# Patient Record
Sex: MALE | Race: WHITE | Employment: FULL TIME | URBAN - METROPOLITAN AREA
[De-identification: names, ages, dates, MRNs, and addresses within clinical notes are randomized per-mention and may not be internally consistent; named-entity substitution may affect disease eponyms.]

---

## 2017-02-23 ENCOUNTER — OFFICE VISIT (OUTPATIENT)
Dept: FAMILY MEDICINE CLINIC | Age: 67
End: 2017-02-23

## 2017-02-23 VITALS
WEIGHT: 221.4 LBS | TEMPERATURE: 97.7 F | OXYGEN SATURATION: 92 % | HEIGHT: 69 IN | HEART RATE: 71 BPM | DIASTOLIC BLOOD PRESSURE: 62 MMHG | SYSTOLIC BLOOD PRESSURE: 107 MMHG | RESPIRATION RATE: 20 BRPM | BODY MASS INDEX: 32.79 KG/M2

## 2017-02-23 DIAGNOSIS — Z12.12 ENCOUNTER FOR COLORECTAL CANCER SCREENING: ICD-10-CM

## 2017-02-23 DIAGNOSIS — Z12.11 ENCOUNTER FOR COLORECTAL CANCER SCREENING: ICD-10-CM

## 2017-02-23 DIAGNOSIS — E55.9 HYPOVITAMINOSIS D: ICD-10-CM

## 2017-02-23 DIAGNOSIS — E11.9 ENCOUNTER FOR DIABETIC FOOT EXAM (HCC): ICD-10-CM

## 2017-02-23 DIAGNOSIS — Z13.29 SCREENING FOR THYROID DISORDER: ICD-10-CM

## 2017-02-23 DIAGNOSIS — E11.9 DIABETIC EYE EXAM (HCC): ICD-10-CM

## 2017-02-23 DIAGNOSIS — Z00.00 ANNUAL PHYSICAL EXAM: ICD-10-CM

## 2017-02-23 DIAGNOSIS — Z12.5 SCREENING FOR PROSTATE CANCER: ICD-10-CM

## 2017-02-23 DIAGNOSIS — E55.9 VITAMIN D DEFICIENCY: ICD-10-CM

## 2017-02-23 DIAGNOSIS — Z01.00 DIABETIC EYE EXAM (HCC): ICD-10-CM

## 2017-02-23 DIAGNOSIS — I10 HYPERTENSION, WELL CONTROLLED: ICD-10-CM

## 2017-02-23 DIAGNOSIS — Z12.11 SCREEN FOR COLON CANCER: ICD-10-CM

## 2017-02-23 DIAGNOSIS — E78.2 MIXED HYPERCHOLESTEROLEMIA AND HYPERTRIGLYCERIDEMIA: ICD-10-CM

## 2017-02-23 LAB
GLUCOSE POC: 133 MG/DL
HBA1C MFR BLD HPLC: 6.5 %

## 2017-02-23 RX ORDER — ALLOPURINOL 300 MG/1
300 TABLET ORAL DAILY
Qty: 90 TAB | Refills: 1 | Status: SHIPPED | OUTPATIENT
Start: 2017-02-23 | End: 2017-07-26 | Stop reason: SDUPTHER

## 2017-02-23 RX ORDER — ATORVASTATIN CALCIUM 20 MG/1
20 TABLET, FILM COATED ORAL DAILY
Qty: 30 TAB | Refills: 5 | Status: SHIPPED | OUTPATIENT
Start: 2017-02-23 | End: 2017-07-26 | Stop reason: SDUPTHER

## 2017-02-23 NOTE — PROGRESS NOTES
1. Have you been to the ER, urgent care clinic since your last visit? Hospitalized since your last visit? No    2. Have you seen or consulted any other health care providers outside of the [facility] since your last visit? Include any pap smears or colon screening.  No     Pt states he is here for diab and med refill

## 2017-02-23 NOTE — PROGRESS NOTES
Chief Complaint   Patient presents with    Medication Refill    Diabetes     HPI:  Cecily Ward is a 61 y.o. male with hypertension, diabetes presenting for routine follow up for medication refill. 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 No pertinent family history. Current Outpatient Prescriptions   Medication Sig Dispense Refill    metFORMIN (GLUCOPHAGE) 500 mg tablet Take 1 Tab by mouth two (2) times daily (with meals). Indications: TYPE 2 DIABETES MELLITUS 60 Tab 5    glucose blood VI test strips (ONETOUCH ULTRA TEST) strip Check blood sugar once a day 50 Strip 3    atorvastatin (LIPITOR) 20 mg tablet Take 1 Tab by mouth daily. Indications: HYPERTRIGLYCERIDEMIA 30 Tab 5    allopurinol (ZYLOPRIM) 300 mg tablet Take 1 Tab by mouth daily. Take every day to prevent gout attacks 90 Tab 1    indomethacin (INDOCIN) 50 mg capsule Take 1 every 8 hrs x 7 days, then 1 every 12 hrs 90 Cap 2    aspirin (ASPIRIN) 325 mg tablet Take 325 mg by mouth daily.          No Known Allergies    Objective:  Visit Vitals    /62 (BP 1 Location: Left arm, BP Patient Position: Sitting)    Pulse 71    Temp 97.7 °F (36.5 °C) (Oral)    Resp 20    Ht 5' 8.5\" (1.74 m)    Wt 221 lb 6.4 oz (100.4 kg)    SpO2 92%    BMI 33.17 kg/m2     Physical Exam: General appearance - alert, well appearing, in no distress  Mental status - alert, oriented to person, place, and time  EYE-PERRL, EOMI  Neck - supple, no significant adenopathy   Chest - clear to auscultation, no wheezes, rales or rhonchi  Heart - normal rate, regular rhythm, normal blood presure  Abdomen - soft, nontender, nondistended, no organomegaly  Ext-peripheral pulses normal, no pedal edema  Neuro -alert, oriented, normal speech, no focal findings    Results for orders placed or performed in visit on 02/23/17   AMB POC HEMOGLOBIN A1C   Result Value Ref Range    Hemoglobin A1c (POC) 6.5 %   AMB POC GLUCOSE BLOOD, BY GLUCOSE MONITORING DEVICE   Result Value Ref Range    Glucose  mg/dL     Assessment/Plan:    ICD-10-CM ICD-9-CM    1. Uncontrolled type 2 diabetes mellitus without complication, without long-term current use of insulin ([facility]) E11.65 250.02 AMB POC HEMOGLOBIN A1C      AMB POC GLUCOSE BLOOD, BY GLUCOSE MONITORING DEVICE      URINALYSIS W/ RFLX MICROSCOPIC      MICROALBUMIN, UR, RAND W/ MICROALBUMIN/CREA RATIO      METABOLIC PANEL, COMPREHENSIVE   2. Mixed hypercholesterolemia and hypertriglyceridemia E78.2 272.2 LIPID PANEL      atorvastatin (LIPITOR) 20 mg tablet   3. Obesity, Class II, BMI 35-39.9, with comorbidity ([facility]) E66.01 278.01    4. Hypertension, well controlled B12 868.9 METABOLIC PANEL, COMPREHENSIVE   5. Hypovitaminosis D E55.9 268.9    6. Diabetic eye exam ([facility] E11.9 V72.0 REFERRAL TO OPHTHALMOLOGY    Z01.00 250.00    7. Encounter for diabetic foot exam ([facility] E11.9 250.00 REFERRAL TO PODIATRY   8. Encounter for colorectal cancer screening Z12.11 V76.51 REFERRAL TO GASTROENTEROLOGY    Z12.12     9. Vitamin D deficiency E55.9 268.9 VITAMIN D, 25 HYDROXY   10.  Annual physical exam Z00.00 V70.0 URINALYSIS W/ RFLX MICROSCOPIC      CBC W/O DIFF      METABOLIC PANEL, COMPREHENSIVE   11. Screen for colon cancer Z12.11 V76.51 REFERRAL TO GASTROENTEROLOGY   12. Screening for thyroid disorder Z13.29 V77.0 TSH 3RD GENERATION   13. Screening for prostate cancer Z12.5 V76.44 PROSTATE SPECIFIC AG (PSA)     Patient Instructions        Colon Cancer Screening: Care Instructions  Your Care Instructions  Colorectal cancer occurs in the colon or rectum. That's the lower part of your digestive system. It is the second-leading cause of cancer deaths in the [place]. It often starts with small growths called polyps in the colon or rectum. Polyps are usually found with screening tests. Depending on the type of test, any polyps found may be removed during the tests. Colorectal cancer usually does not cause symptoms at first. But regular tests can help find it early, before it spreads and becomes harder to treat. Experts advise routine tests for colon cancer for people starting at age 48. And they advise people with a higher risk of colon cancer to get tested sooner. Talk with your doctor about when you should start testing. Discuss which tests you need. Follow-up care is a key part of your treatment and safety. Be sure to make and go to all appointments, and call your doctor if you are having problems. It's also a good idea to know your test results and keep a list of the medicines you take. What are the main screening tests for colon cancer? · Stool tests. These include the fecal immunochemical test (FIT) and the fecal occult blood test (FOBT). These tests check stool samples for signs of cancer. If your test is positive, you will need to have a colonoscopy. · Sigmoidoscopy. This test lets your doctor look at the lining of your rectum and the lowest part of your colon. Your doctor uses a lighted tube called a sigmoidoscope. This test can't find cancers or polyps in the upper part of your colon. In some cases, polyps that are found can be removed. But if your doctor finds polyps, you will need to have a colonoscopy to check the upper part of your colon. · Colonoscopy.  This test lets your doctor look at the lining of your rectum and your entire colon. The doctor uses a thin, flexible tool called a colonoscope. It can also be used to remove polyps or get a tissue sample (biopsy). What tests do you need? The following guidelines are for people age 48 and over who are not at high risk for colorectal cancer. You may have at least one of these tests as directed by your doctor. · Fecal immunochemical test (FIT) or fecal occult blood test (FOBT) every year  · Sigmoidoscopy every 5 years  · Colonoscopy every 10 years  If you are age 76 and have had regular screenings or are age [de-identified] or older, you may not need screening. Talk with your doctor about when you need to be tested. And discuss which tests are right for you. Your doctor may recommend earlier or more frequent testing if you:  · Have had colorectal cancer before. · Have had colon polyps. · Have symptoms of colorectal cancer. These include blood in your stool and changes in your bowel habits. · Have a parent, brother or sister, or child with colon polyps or colorectal cancer. · Have a bowel disease. This includes ulcerative colitis and Crohn's disease. · Have a rare polyp syndrome that runs in families, such as familial adenomatous polyposis (FAP). · Have had radiation treatments to the belly or pelvis. When should you call for help? Call your doctor now or seek immediate medical care if:  · Your stools are black and tarlike or have streaks of blood. · You have pain or tenderness in your lower belly. Watch closely for changes in your health, and be sure to contact your doctor if:  · You have diarrhea, constipation, or another change in bowel habits that does not get better. · Your stools are narrower than usual.  · You lose weight and you don't know why. · You have any questions about your screening tests or schedule. Where can you learn more? Go to http://lyla-mavis.info/.   Enter M541 in the search box to learn more about \"Colon Cancer Screening: Care Instructions. \"  Current as of: July 26, 2016  Content Version: 11.1  © 5417-9397 [employer], [employer]. Care instructions adapted under license by [employer] (which disclaims liability or warranty for this information). If you have questions about a medical condition or this instruction, always ask your healthcare professional. [place] [address] any warranty or liability for your use of this information. Follow-up Disposition:  Return 2-3 weeks, for f/u results.

## 2017-02-24 LAB
25(OH)D3+25(OH)D2 SERPL-MCNC: 12.7 NG/ML (ref 30–100)
ALBUMIN SERPL-MCNC: 4.3 G/DL (ref 3.6–4.8)
ALBUMIN/CREAT UR: 4 MG/G CREAT (ref 0–30)
ALBUMIN/GLOB SERPL: 1.5 {RATIO} (ref 1.1–2.5)
ALP SERPL-CCNC: 127 IU/L (ref 39–117)
ALT SERPL-CCNC: 24 IU/L (ref 0–44)
APPEARANCE UR: CLEAR
AST SERPL-CCNC: 19 IU/L (ref 0–40)
BILIRUB SERPL-MCNC: 0.5 MG/DL (ref 0–1.2)
BILIRUB UR QL STRIP: NEGATIVE
BUN SERPL-MCNC: 11 MG/DL (ref 8–27)
BUN/CREAT SERPL: 10 (ref 10–22)
CALCIUM SERPL-MCNC: 9.4 MG/DL (ref 8.6–10.2)
CHLORIDE SERPL-SCNC: 101 MMOL/L (ref 96–106)
CHOLEST SERPL-MCNC: 192 MG/DL (ref 100–199)
CO2 SERPL-SCNC: 25 MMOL/L (ref 18–29)
COLOR UR: YELLOW
CREAT SERPL-MCNC: 1.12 MG/DL (ref 0.76–1.27)
CREAT UR-MCNC: 174.7 MG/DL
ERYTHROCYTE [DISTWIDTH] IN BLOOD BY AUTOMATED COUNT: 13.8 % (ref 12.3–15.4)
GLOBULIN SER CALC-MCNC: 2.9 G/DL (ref 1.5–4.5)
GLUCOSE SERPL-MCNC: 107 MG/DL (ref 65–99)
GLUCOSE UR QL: NEGATIVE
HCT VFR BLD AUTO: 46.4 % (ref 37.5–51)
HDLC SERPL-MCNC: 32 MG/DL
HGB BLD-MCNC: 16.1 G/DL (ref 12.6–17.7)
HGB UR QL STRIP: NEGATIVE
KETONES UR QL STRIP: NEGATIVE
LDLC SERPL CALC-MCNC: 109 MG/DL (ref 0–99)
LEUKOCYTE ESTERASE UR QL STRIP: NEGATIVE
MCH RBC QN AUTO: 29.8 PG (ref 26.6–33)
MCHC RBC AUTO-ENTMCNC: 34.7 G/DL (ref 31.5–35.7)
MCV RBC AUTO: 86 FL (ref 79–97)
MICRO URNS: NORMAL
MICROALBUMIN UR-MCNC: 7 UG/ML
NITRITE UR QL STRIP: NEGATIVE
PH UR STRIP: 5.5 [PH] (ref 5–7.5)
PLATELET # BLD AUTO: 227 X10E3/UL (ref 150–379)
POTASSIUM SERPL-SCNC: 4.5 MMOL/L (ref 3.5–5.2)
PROT SERPL-MCNC: 7.2 G/DL (ref 6–8.5)
PROT UR QL STRIP: NEGATIVE
PSA SERPL-MCNC: 4.5 NG/ML (ref 0–4)
RBC # BLD AUTO: 5.4 X10E6/UL (ref 4.14–5.8)
SODIUM SERPL-SCNC: 142 MMOL/L (ref 134–144)
SP GR UR: 1.03 (ref 1–1.03)
TRIGL SERPL-MCNC: 253 MG/DL (ref 0–149)
TSH SERPL DL<=0.005 MIU/L-ACNC: 2.74 UIU/ML (ref 0.45–4.5)
UROBILINOGEN UR STRIP-MCNC: 0.2 MG/DL (ref 0.2–1)
VLDLC SERPL CALC-MCNC: 51 MG/DL (ref 5–40)
WBC # BLD AUTO: 9 X10E3/UL (ref 3.4–10.8)

## 2017-02-25 NOTE — PATIENT INSTRUCTIONS
Colon Cancer Screening: Care Instructions  Your Care Instructions  Colorectal cancer occurs in the colon or rectum. That's the lower part of your digestive system. It is the second-leading cause of cancer deaths in the [place]. It often starts with small growths called polyps in the colon or rectum. Polyps are usually found with screening tests. Depending on the type of test, any polyps found may be removed during the tests. Colorectal cancer usually does not cause symptoms at first. But regular tests can help find it early, before it spreads and becomes harder to treat. Experts advise routine tests for colon cancer for people starting at age 48. And they advise people with a higher risk of colon cancer to get tested sooner. Talk with your doctor about when you should start testing. Discuss which tests you need. Follow-up care is a key part of your treatment and safety. Be sure to make and go to all appointments, and call your doctor if you are having problems. It's also a good idea to know your test results and keep a list of the medicines you take. What are the main screening tests for colon cancer? · Stool tests. These include the fecal immunochemical test (FIT) and the fecal occult blood test (FOBT). These tests check stool samples for signs of cancer. If your test is positive, you will need to have a colonoscopy. · Sigmoidoscopy. This test lets your doctor look at the lining of your rectum and the lowest part of your colon. Your doctor uses a lighted tube called a sigmoidoscope. This test can't find cancers or polyps in the upper part of your colon. In some cases, polyps that are found can be removed. But if your doctor finds polyps, you will need to have a colonoscopy to check the upper part of your colon. · Colonoscopy. This test lets your doctor look at the lining of your rectum and your entire colon. The doctor uses a thin, flexible tool called a colonoscope.  It can also be used to remove polyps or get a tissue sample (biopsy). What tests do you need? The following guidelines are for people age 48 and over who are not at high risk for colorectal cancer. You may have at least one of these tests as directed by your doctor. · Fecal immunochemical test (FIT) or fecal occult blood test (FOBT) every year  · Sigmoidoscopy every 5 years  · Colonoscopy every 10 years  If you are age 76 and have had regular screenings or are age [de-identified] or older, you may not need screening. Talk with your doctor about when you need to be tested. And discuss which tests are right for you. Your doctor may recommend earlier or more frequent testing if you:  · Have had colorectal cancer before. · Have had colon polyps. · Have symptoms of colorectal cancer. These include blood in your stool and changes in your bowel habits. · Have a parent, brother or sister, or child with colon polyps or colorectal cancer. · Have a bowel disease. This includes ulcerative colitis and Crohn's disease. · Have a rare polyp syndrome that runs in families, such as familial adenomatous polyposis (FAP). · Have had radiation treatments to the belly or pelvis. When should you call for help? Call your doctor now or seek immediate medical care if:  · Your stools are black and tarlike or have streaks of blood. · You have pain or tenderness in your lower belly. Watch closely for changes in your health, and be sure to contact your doctor if:  · You have diarrhea, constipation, or another change in bowel habits that does not get better. · Your stools are narrower than usual.  · You lose weight and you don't know why. · You have any questions about your screening tests or schedule. Where can you learn more? Go to http://lyla-mavis.info/. Enter M541 in the search box to learn more about \"Colon Cancer Screening: Care Instructions. \"  Current as of: July 26, 2016  Content Version: 11.1  © 1952-8687 [employer], [employer].  Care instructions adapted under license by [employer] (which disclaims liability or warranty for this information). If you have questions about a medical condition or this instruction, always ask your healthcare professional. [address] any warranty or liability for your use of this information.

## 2017-03-28 ENCOUNTER — OFFICE VISIT (OUTPATIENT)
Dept: FAMILY MEDICINE CLINIC | Age: 67
End: 2017-03-28

## 2017-03-28 VITALS
RESPIRATION RATE: 16 BRPM | SYSTOLIC BLOOD PRESSURE: 116 MMHG | HEART RATE: 86 BPM | WEIGHT: 219.2 LBS | DIASTOLIC BLOOD PRESSURE: 88 MMHG | HEIGHT: 69 IN | BODY MASS INDEX: 32.47 KG/M2 | TEMPERATURE: 97 F | OXYGEN SATURATION: 97 %

## 2017-03-28 DIAGNOSIS — I10 HYPERTENSION, WELL CONTROLLED: ICD-10-CM

## 2017-03-28 DIAGNOSIS — E55.9 HYPOVITAMINOSIS D: ICD-10-CM

## 2017-03-28 DIAGNOSIS — E78.2 MIXED HYPERCHOLESTEROLEMIA AND HYPERTRIGLYCERIDEMIA: Primary | ICD-10-CM

## 2017-03-28 DIAGNOSIS — E11.00 UNCONTROLLED TYPE 2 DIABETES MELLITUS WITH HYPEROSMOLARITY WITHOUT COMA, WITHOUT LONG-TERM CURRENT USE OF INSULIN (HCC): ICD-10-CM

## 2017-03-28 DIAGNOSIS — R97.20 ELEVATED PSA MEASUREMENT: ICD-10-CM

## 2017-03-28 RX ORDER — ASPIRIN 325 MG
325 TABLET ORAL DAILY
Qty: 90 TAB | Refills: 1 | Status: SHIPPED | OUTPATIENT
Start: 2017-03-28

## 2017-03-28 RX ORDER — METFORMIN HYDROCHLORIDE 500 MG/1
500 TABLET ORAL 2 TIMES DAILY WITH MEALS
Qty: 60 TAB | Refills: 5 | Status: SHIPPED | OUTPATIENT
Start: 2017-03-28 | End: 2017-07-26 | Stop reason: SDUPTHER

## 2017-03-28 RX ORDER — CHOLECALCIFEROL TAB 125 MCG (5000 UNIT) 125 MCG
5000 TAB ORAL DAILY
Qty: 90 TAB | Refills: 3 | Status: SHIPPED | OUTPATIENT
Start: 2017-03-28 | End: 2018-08-14 | Stop reason: SDUPTHER

## 2017-03-28 NOTE — LETTER
3/28/2017 4:37 PM 
 
Mr. Homer [address] [facility] [address] Dear Homer: Please find your most recent results below. Resulted Orders AMB POC HEMOGLOBIN A1C Result Value Ref Range Hemoglobin A1c (POC) 6.5 % AMB POC GLUCOSE BLOOD, BY GLUCOSE MONITORING DEVICE Result Value Ref Range Glucose  mg/dL URINALYSIS W/ RFLX MICROSCOPIC Result Value Ref Range Specific Gravity 1.026 1.005 - 1.030  
 pH (UA) 5.5 5.0 - 7.5 Color Yellow Yellow Appearance Clear Clear Leukocyte Esterase Negative Negative Protein Negative Negative/Trace Glucose Negative Negative Ketone Negative Negative Blood Negative Negative Bilirubin Negative Negative Urobilinogen 0.2 0.2 - 1.0 mg/dL Nitrites Negative Negative Microscopic Examination Comment Comment:  
   Microscopic not indicated and not performed. Narrative Performed at:  [address]  [id number] : Anna Melendez MD, Phone:  [phone] MICROALBUMIN, UR, RAND W/ MICROALBUMIN/CREA RATIO Result Value Ref Range Creatinine, urine 174.7 Not Estab. mg/dL Microalbumin, urine 7.0 Not Estab. ug/mL Microalb/Creat ratio (ug/mg creat.) 4.0 0.0 - 30.0 mg/g creat Narrative Performed at:  [address]  [id number] : Anna Melendez MD, Phone:  [phone] CBC W/O DIFF Result Value Ref Range WBC 9.0 3.4 - 10.8 x10E3/uL  
 RBC 5.40 4. 14 - 5.80 x10E6/uL HGB 16.1 12.6 - 17.7 g/dL HCT 46.4 37.5 - 51.0 % MCV 86 79 - 97 fL  
 MCH 29.8 26.6 - 33.0 pg  
 MCHC 34.7 31.5 - 35.7 g/dL  
 RDW 13.8 12.3 - 15.4 % PLATELET 250 112 - 700 x10E3/uL Narrative Performed at:  [address]  [id number] : Anna Melendez MD, Phone:  [phone] METABOLIC PANEL, COMPREHENSIVE Result Value Ref Range Glucose 107 (H) 65 - 99 mg/dL BUN 11 8 - 27 mg/dL Creatinine 1.12 0.76 - 1.27 mg/dL GFR est non-AA 69 >59 mL/min/1.73 GFR est AA 80 >59 mL/min/1.73  
 BUN/Creatinine ratio 10 10 - 22 Sodium 142 134 - 144 mmol/L Potassium 4.5 3.5 - 5.2 mmol/L Chloride 101 96 - 106 mmol/L  
 CO2 25 18 - 29 mmol/L Calcium 9.4 8.6 - 10.2 mg/dL Protein, total 7.2 6.0 - 8.5 g/dL Albumin 4.3 3.6 - 4.8 g/dL GLOBULIN, TOTAL 2.9 1.5 - 4.5 g/dL A-G Ratio 1.5 1.1 - 2.5 Comment: **Effective March 13, 2017 the reference interval** 
  for A/G Ratio will be changing to: Age                Male          Female 0 -  7 days       1.1 - 2.3       1.1 - 2.3 
          8 - 30 days       1.2 - 2.8       1.2 - 2.8 
          1 -  6 months     1.3 - 3.6       1.3 - 3.6 
   7 months -  5 years      1.5 - 2.6       1.5 - 2.6 
             > 5 years      1.2 - 2.2       1.2 - 2.2 Bilirubin, total 0.5 0.0 - 1.2 mg/dL Alk. phosphatase 127 (H) 39 - 117 IU/L  
 AST (SGOT) 19 0 - 40 IU/L  
 ALT (SGPT) 24 0 - 44 IU/L Narrative Performed at:  [address]  [id number] : Ang Douglass MD, Phone:  [phone] TSH 3RD GENERATION Result Value Ref Range TSH 2.740 0.450 - 4.500 uIU/mL Narrative Performed at:  [address]  [id number] : Ang Douglass MD, Phone:  [phone] LIPID PANEL Result Value Ref Range Cholesterol, total 192 100 - 199 mg/dL Triglyceride 253 (H) 0 - 149 mg/dL HDL Cholesterol 32 (L) >39 mg/dL VLDL, calculated 51 (H) 5 - 40 mg/dL LDL, calculated 109 (H) 0 - 99 mg/dL Narrative Performed at:  [address]  [id number] : Ang Douglass MD, Phone:  [phone] VITAMIN D, 25 HYDROXY Result Value Ref Range VITAMIN D, 25-HYDROXY 12.7 (L) 30.0 - 100.0 ng/mL Comment:  
   Vitamin D deficiency has been defined by the [facility] practice guideline as a 
level of serum 25-OH vitamin D less than 20 ng/mL (1,2). The Endocrine Society went on to further define vitamin D 
insufficiency as a level between 21 and 29 ng/mL (2). 1. IOM ([place]). 2010. Dietary reference 
   intakes for calcium and D. [facility]: The 
   [employer]. 2. Cayden MF, Sin NC, Jaime SANTOYO, et al. 
   Evaluation, treatment, and prevention of vitamin D 
   deficiency: an Endocrine Society clinical practice 
   guideline. JCEM. 2011 Jul; 96(7):1911-30. Narrative Performed at:  [address]  [id number] : Kash Ren MD, Phone:  [phone] PROSTATE SPECIFIC AG (PSA) Result Value Ref Range Prostate Specific Ag 4.5 (H) 0.0 - 4.0 ng/mL Comment:  
   Roche ECLIA methodology. According to the American Urological Association, Serum PSA should 
decrease and remain at undetectable levels after radical 
prostatectomy. The AUA defines biochemical recurrence as an initial 
PSA value 0.2 ng/mL or greater followed by a subsequent confirmatory PSA value 0.2 ng/mL or greater. Values obtained with different assay methods or kits cannot be used 
interchangeably. Results cannot be interpreted as absolute evidence 
of the presence or absence of malignant disease. Narrative Performed at:  [address]  [id number] : Kash Ren MD, Phone:  [phone] RECOMMENDATIONS: 
 
 
Please call me if you have any questions: [phone] Sincerely, Caro Almendarez MD

## 2017-03-28 NOTE — MR AVS SNAPSHOT
Visit Information Date & Time Provider Department Dept. Phone Encounter #  
 3/28/2017  3:45 PM Miguel Angel Blount MD [facility] at [address] [id number] Follow-up Instructions Return in about 4 months (around 7/28/2017), or if symptoms worsen or fail to improve, for routine f/u. Upcoming Health Maintenance Date Due  
 FOOT EXAM Q1 11/4/1962 EYE EXAM RETINAL OR DILATED Q1 11/4/1962 FOBT Q 1 YEAR AGE 50-75 11/4/2002 ZOSTER VACCINE AGE 60> 11/4/2012 HEMOGLOBIN A1C Q6M 8/23/2017 MICROALBUMIN Q1 2/23/2018 LIPID PANEL Q1 2/23/2018 DTaP/Tdap/Td series (2 - Td) 2/23/2027 Allergies as of 3/28/2017  Review Complete On: 3/28/2017 By: Miguel Angel Blount MD  
 No Known Allergies Current Immunizations  Never Reviewed No immunizations on file. Not reviewed this visit You Were Diagnosed With   
  
 Codes Comments Mixed hypercholesterolemia and hypertriglyceridemia    -  Primary ICD-10-CM: L66.2 ICD-9-CM: 272.2 Hypertension, well controlled     ICD-10-CM: I10 
ICD-9-CM: 401.9 Uncontrolled type 2 diabetes mellitus with hyperosmolarity without coma, without long-term current use of insulin (HCC)     ICD-10-CM: E11.00 ICD-9-CM: 250.22 Hypovitaminosis D     ICD-10-CM: E55.9 ICD-9-CM: 268.9 Elevated PSA measurement     ICD-10-CM: R97.20 ICD-9-CM: 790.93 Vitals BP Pulse Temp Resp Height(growth percentile) Weight(growth percentile) 116/88 (BP 1 Location: Right arm, BP Patient Position: Sitting) 86 97 °F (36.1 °C) (Oral) 16 5' 8.5\" (1.74 m) 219 lb 3.2 oz (99.4 kg) SpO2 BMI Smoking Status 97% 32.84 kg/m2 Never Smoker Vitals History BMI and BSA Data Body Mass Index Body Surface Area  
 32.84 kg/m 2 2.19 m 2 Preferred Pharmacy Pharmacy Name Phone [facility] [place], [address] [address] [place] [address] [address] Your Updated Medication List  
  
   
 This list is accurate as of: 3/28/17  4:53 PM.  Always use your most recent med list.  
  
  
  
  
 allopurinol 300 mg tablet Commonly known as:  Debbie Gault Take 1 Tab by mouth daily. Take every day to prevent gout attacks  
  
 aspirin 325 mg tablet Commonly known as:  aspirin Take 1 Tab by mouth daily. atorvastatin 20 mg tablet Commonly known as:  LIPITOR Take 1 Tab by mouth daily. Indications: HYPERTRIGLYCERIDEMIA  
  
 cholecalciferol (VITAMIN D3) 5,000 unit Tab tablet Commonly known as:  VITAMIN D3 Take 1 Tab by mouth daily. glucose blood VI test strips strip Commonly known as:  ONETOUCH ULTRA TEST Check blood sugar once a day  
  
 metFORMIN 500 mg tablet Commonly known as:  GLUCOPHAGE Take 1 Tab by mouth two (2) times daily (with meals). Indications: type 2 diabetes mellitus Prescriptions Sent to Pharmacy Refills  
 metFORMIN (GLUCOPHAGE) 500 mg tablet 5 Sig: Take 1 Tab by mouth two (2) times daily (with meals). Indications: type 2 diabetes mellitus Class: Normal  
 Pharmacy: [facility] [address] Ph #: [phone] Route: Oral  
 cholecalciferol, VITAMIN D3, (VITAMIN D3) 5,000 unit tab tablet 3 Sig: Take 1 Tab by mouth daily. Class: Normal  
 Pharmacy: [facility] [address] Ph #: [phone] Route: Oral  
 aspirin (ASPIRIN) 325 mg tablet 1 Sig: Take 1 Tab by mouth daily. Class: Normal  
 Pharmacy: [facility] [address] Ph #: [phone] Route: Oral  
  
We Performed the Following REFERRAL TO UROLOGY [SDT482 Custom] Comments:  
 Please evaluate patient for elevated psa level Follow-up Instructions Return in about 4 months (around 7/28/2017), or if symptoms worsen or fail to improve, for routine f/u. Referral Information Referral ID Referred By Referred To [id number] [facility], [facility] . [address]   
   [place], [address] Visits Status Start Date End Date 1 New Request 3/28/17 3/28/18 If your referral has a status of pending review or denied, additional information will be sent to support the outcome of this decision. Patient Instructions Prostate-Specific Antigen (PSA) Test: About This Test 
What is it? A prostate-specific antigen (PSA) test measures the amount of PSA in your blood. PSA is released by a man's prostate gland into his blood. A high PSA level may mean that you have an enlargement, infection, or cancer of the prostate. Why is this test done? You may have this test to: · Check for prostate cancer. · Watch prostate cancer and see if treatment is working. How can you prepare for the test? 
Do not ejaculate during the 2 days before your PSA blood test, either during sex or masturbation. What happens before the test? 
Tell your doctor if you have had a: 
· Test to look at your bladder (cystoscopy) in the past several weeks. · Prostate biopsy in the past several weeks. · Prostate infection or urinary tract infection that has not gone away. · Tube (catheter) inserted into your bladder to drain urine recently. What happens during the test? 
A health professional takes a sample of your blood. What happens after the test? 
You can go back to your usual activities right away. When should you call for help? Watch closely for changes in your health, and be sure to contact your doctor if you have any questions about this test. 
Follow-up care is a key part of your treatment and safety. Be sure to make and go to all appointments, and call your doctor if you are having problems. It's also a good idea to keep a list of the medicines you take. Ask your doctor when you can expect to have your test results. Where can you learn more? Go to http://lyla-mavis.info/. Enter X178 in the search box to learn more about \"Prostate-Specific Antigen (PSA) Test: About This Test.\" Current as of: July 26, 2016 Content Version: 11.2 © 0028-7860 [employer]. Care instructions adapted under license by [employer] (which disclaims liability or warranty for this information). If you have questions about a medical condition or this instruction, always ask your healthcare professional. [address] any warranty or liability for your use of this information. Introducing [facility]! Dear Sho Tellez: Thank you for requesting a Emu Messenger account. Our records indicate that you have previously registered for a Emu Messenger account but its currently inactive. Please call our Emu Messenger support line at [phone]. Additional Information If you have questions, please visit the Frequently Asked Questions section of the Emu Messenger website at [email or web address] Remember, Emu Messenger is NOT to be used for urgent needs. For medical emergencies, dial 911. Now available from your iPhone and Android! Please provide this summary of care documentation to your next provider. Your primary care clinician is listed as Maria Elena David. If you have any questions after today's visit, please call [phone].

## 2017-03-28 NOTE — PROGRESS NOTES
Chief Complaint   Patient presents with    Hypertension     2/3 week f/u    Diabetes     HPI:  Bradford Cardoza is a 61 y.o.  male with hypercholesterolemia, hypertension and diabetes presenting for  lab review. Alk phos level is still elevated, A1c is at 6.5 indicating good glycemic control. Trig is elevated at 253 mg/dl, encouraged to continue Lipitor. Serum vit D level is low, agrees to start supplement. PSA is elevated at 4.5 ng/ml, he choice to see a urologist.     Review of Systems  As per hpi    Past Medical History:   Diagnosis Date    Diabetes ([address]     Gout 3/24/2014    HTN (hypertension) 3/24/2014    Obesity, Class II, BMI 35-39.9, with comorbidity ([address] 3/24/2014    Partial unilateral paresis ([facility] [address] 3/24/2014     Past Surgical History:   Procedure Laterality Date    HX ORTHOPAEDIC      repair left eye socket     Social History    Marital status:      Spouse name: N/A    Number of children: N/A    Years of education: N/A     Social History Main Topics    Smoking status: Never Smoker    Smokeless tobacco: Never Used    Alcohol use No    Drug use: No    Sexual activity: Not Asked     Current Outpatient Prescriptions   Medication Sig Dispense Refill    allopurinol (ZYLOPRIM) 300 mg tablet Take 1 Tab by mouth daily. Take every day to prevent gout attacks 90 Tab 1    atorvastatin (LIPITOR) 20 mg tablet Take 1 Tab by mouth daily. Indications: HYPERTRIGLYCERIDEMIA 30 Tab 5    metFORMIN (GLUCOPHAGE) 500 mg tablet Take 1 Tab by mouth two (2) times daily (with meals). Indications: TYPE 2 DIABETES MELLITUS 60 Tab 5    glucose blood VI test strips (ONETOUCH ULTRA TEST) strip Check blood sugar once a day 50 Strip 3    aspirin (ASPIRIN) 325 mg tablet Take 325 mg by mouth daily.          No Known Allergies    Objective:  Visit Vitals    /88 (BP 1 Location: Right arm, BP Patient Position: Sitting)    Pulse 86    Temp 97 °F (36.1 °C) (Oral)    Resp 16    Ht 5' 8.5\" (1.74 m)    Wt 219 lb 3.2 oz (99.4 kg)    SpO2 97%    BMI 32.84 kg/m2     Physical Exam:   General appearance - alert, well appearing, in no distress  Mental status - alert, oriented to person, place, and time  EYE-PERRL, EOMI  Neck - supple, no significant adenopathy   Chest - clear to auscultation, no wheezes, rales or rhonchi  Heart - normal rate, regular rhythm, normal blood pressure  Abdomen - soft, nontender, nondistended, no organomegaly  Ext-peripheral pulses normal, no pedal edema  Neuro -alert, oriented, normal speech, no focal findings    Results for orders placed or performed in visit on 02/23/17   URINALYSIS W/ RFLX MICROSCOPIC   Result Value Ref Range    Specific Gravity 1.026 1.005 - 1.030    pH (UA) 5.5 5.0 - 7.5    Color Yellow Yellow    Appearance Clear Clear    Leukocyte Esterase Negative Negative    Protein Negative Negative/Trace    Glucose Negative Negative    Ketone Negative Negative    Blood Negative Negative    Bilirubin Negative Negative    Urobilinogen 0.2 0.2 - 1.0 mg/dL    Nitrites Negative Negative    Microscopic Examination Comment    MICROALBUMIN, UR, RAND W/ MICROALBUMIN/CREA RATIO   Result Value Ref Range    Creatinine, urine 174.7 Not Estab. mg/dL    Microalbumin, urine 7.0 Not Estab. ug/mL    Microalb/Creat ratio (ug/mg creat.) 4.0 0.0 - 30.0 mg/g creat   CBC W/O DIFF   Result Value Ref Range    WBC 9.0 3.4 - 10.8 x10E3/uL    RBC 5.40 4. 14 - 5.80 x10E6/uL    HGB 16.1 12.6 - 17.7 g/dL    HCT 46.4 37.5 - 51.0 %    MCV 86 79 - 97 fL    MCH 29.8 26.6 - 33.0 pg    MCHC 34.7 31.5 - 35.7 g/dL    RDW 13.8 12.3 - 15.4 %    PLATELET 030 024 - 413 N86C4/LYONS   METABOLIC PANEL, COMPREHENSIVE   Result Value Ref Range    Glucose 107 (H) 65 - 99 mg/dL    BUN 11 8 - 27 mg/dL    Creatinine 1.12 0.76 - 1.27 mg/dL    GFR est non-AA 69 >59 mL/min/1.73    GFR est AA 80 >59 mL/min/1.73    BUN/Creatinine ratio 10 10 - 22    Sodium 142 134 - 144 mmol/L    Potassium 4.5 3.5 - 5.2 mmol/L    Chloride 101 96 - 106 mmol/L    CO2 25 18 - 29 mmol/L    Calcium 9.4 8.6 - 10.2 mg/dL    Protein, total 7.2 6.0 - 8.5 g/dL    Albumin 4.3 3.6 - 4.8 g/dL    GLOBULIN, TOTAL 2.9 1.5 - 4.5 g/dL    A-G Ratio 1.5 1.1 - 2.5    Bilirubin, total 0.5 0.0 - 1.2 mg/dL    Alk. phosphatase 127 (H) 39 - 117 IU/L    AST (SGOT) 19 0 - 40 IU/L    ALT (SGPT) 24 0 - 44 IU/L   TSH 3RD GENERATION   Result Value Ref Range    TSH 2.740 0.450 - 4.500 uIU/mL   LIPID PANEL   Result Value Ref Range    Cholesterol, total 192 100 - 199 mg/dL    Triglyceride 253 (H) 0 - 149 mg/dL    HDL Cholesterol 32 (L) >39 mg/dL    VLDL, calculated 51 (H) 5 - 40 mg/dL    LDL, calculated 109 (H) 0 - 99 mg/dL   VITAMIN D, 25 HYDROXY   Result Value Ref Range    VITAMIN D, 25-HYDROXY 12.7 (L) 30.0 - 100.0 ng/mL   PROSTATE SPECIFIC AG (PSA)   Result Value Ref Range    Prostate Specific Ag 4.5 (H) 0.0 - 4.0 ng/mL   AMB POC HEMOGLOBIN A1C   Result Value Ref Range    Hemoglobin A1c (POC) 6.5 %   AMB POC GLUCOSE BLOOD, BY GLUCOSE MONITORING DEVICE   Result Value Ref Range    Glucose  mg/dL     Assessment/Plan:    ICD-10-CM ICD-9-CM    1. Mixed hypercholesterolemia and hypertriglyceridemia E78.2 272.2    2. Hypertension, well controlled I10 401.9 aspirin (ASPIRIN) 325 mg tablet   3. Uncontrolled type 2 diabetes mellitus with hyperosmolarity without coma, without long-term current use of insulin (HCC) E11.00 250.22 metFORMIN (GLUCOPHAGE) 500 mg tablet   4. Hypovitaminosis D E55.9 268.9 cholecalciferol, VITAMIN D3, (VITAMIN D3) 5,000 unit tab tablet   5. Elevated PSA measurement R97.20 790.93 REFERRAL TO UROLOGY     Patient Instructions        Prostate-Specific Antigen (PSA) Test: About This Test  What is it? A prostate-specific antigen (PSA) test measures the amount of PSA in your blood. PSA is released by a man's prostate gland into his blood. A high PSA level may mean that you have an enlargement, infection, or cancer of the prostate. Why is this test done?   You may have this test to:  · Check for prostate cancer. · Watch prostate cancer and see if treatment is working. How can you prepare for the test?  Do not ejaculate during the 2 days before your PSA blood test, either during sex or masturbation. What happens before the test?  Tell your doctor if you have had a:  · Test to look at your bladder (cystoscopy) in the past several weeks. · Prostate biopsy in the past several weeks. · Prostate infection or urinary tract infection that has not gone away. · Tube (catheter) inserted into your bladder to drain urine recently. What happens during the test?  A health professional takes a sample of your blood. What happens after the test?  You can go back to your usual activities right away. When should you call for help? Watch closely for changes in your health, and be sure to contact your doctor if you have any questions about this test.  Follow-up care is a key part of your treatment and safety. Be sure to make and go to all appointments, and call your doctor if you are having problems. It's also a good idea to keep a list of the medicines you take. Ask your doctor when you can expect to have your test results. Where can you learn more? Go to http://lyla-mavis.info/. Enter W703 in the search box to learn more about \"Prostate-Specific Antigen (PSA) Test: About This Test.\"  Current as of: July 26, 2016  Content Version: 11.2  © 3864-1856 [employer]wise[employer]. Care instructions adapted under license by [employer] (which disclaims liability or warranty for this information). If you have questions about a medical condition or this instruction, always ask your healthcare professional. [place] [address] any warranty or liability for your use of this information. Follow-up Disposition:  Return in about 4 months (around 7/28/2017), or if symptoms worsen or fail to improve, for routine f/u.

## 2017-03-28 NOTE — PATIENT INSTRUCTIONS
Prostate-Specific Antigen (PSA) Test: About This Test  What is it? A prostate-specific antigen (PSA) test measures the amount of PSA in your blood. PSA is released by a man's prostate gland into his blood. A high PSA level may mean that you have an enlargement, infection, or cancer of the prostate. Why is this test done? You may have this test to:  · Check for prostate cancer. · Watch prostate cancer and see if treatment is working. How can you prepare for the test?  Do not ejaculate during the 2 days before your PSA blood test, either during sex or masturbation. What happens before the test?  Tell your doctor if you have had a:  · Test to look at your bladder (cystoscopy) in the past several weeks. · Prostate biopsy in the past several weeks. · Prostate infection or urinary tract infection that has not gone away. · Tube (catheter) inserted into your bladder to drain urine recently. What happens during the test?  A health professional takes a sample of your blood. What happens after the test?  You can go back to your usual activities right away. When should you call for help? Watch closely for changes in your health, and be sure to contact your doctor if you have any questions about this test.  Follow-up care is a key part of your treatment and safety. Be sure to make and go to all appointments, and call your doctor if you are having problems. It's also a good idea to keep a list of the medicines you take. Ask your doctor when you can expect to have your test results. Where can you learn more? Go to http://lyla-mavis.info/. Enter C209 in the search box to learn more about \"Prostate-Specific Antigen (PSA) Test: About This Test.\"  Current as of: July 26, 2016  Content Version: 11.2  © 0662-8611 [employer]. Care instructions adapted under license by [employer] (which disclaims liability or warranty for this information).  If you have questions about a medical condition or this instruction, always ask your healthcare professional. [place] [address] any warranty or liability for your use of this information.

## 2017-07-26 DIAGNOSIS — E78.2 MIXED HYPERCHOLESTEROLEMIA AND HYPERTRIGLYCERIDEMIA: ICD-10-CM

## 2017-07-26 DIAGNOSIS — E11.00 UNCONTROLLED TYPE 2 DIABETES MELLITUS WITH HYPEROSMOLARITY WITHOUT COMA, WITHOUT LONG-TERM CURRENT USE OF INSULIN (HCC): ICD-10-CM

## 2017-07-27 RX ORDER — ALLOPURINOL 300 MG/1
TABLET ORAL
Qty: 90 TAB | Refills: 2 | Status: SHIPPED | OUTPATIENT
Start: 2017-07-27 | End: 2018-08-14 | Stop reason: SDUPTHER

## 2017-07-27 RX ORDER — METFORMIN HYDROCHLORIDE 500 MG/1
TABLET ORAL
Qty: 180 TAB | Refills: 2 | Status: SHIPPED | OUTPATIENT
Start: 2017-07-27 | End: 2018-08-14 | Stop reason: SDUPTHER

## 2017-07-27 RX ORDER — ATORVASTATIN CALCIUM 20 MG/1
TABLET, FILM COATED ORAL
Qty: 90 TAB | Refills: 2 | Status: SHIPPED | OUTPATIENT
Start: 2017-07-27 | End: 2018-08-14 | Stop reason: SDUPTHER

## 2017-12-12 DIAGNOSIS — E55.9 HYPOVITAMINOSIS D: Primary | ICD-10-CM

## 2017-12-12 RX ORDER — CHOLECALCIFEROL TAB 125 MCG (5000 UNIT) 125 MCG
5000 TAB ORAL DAILY
Qty: 90 TAB | Refills: 1 | Status: SHIPPED | OUTPATIENT
Start: 2017-12-12 | End: 2019-01-07 | Stop reason: SDUPTHER

## 2018-08-14 ENCOUNTER — OFFICE VISIT (OUTPATIENT)
Dept: FAMILY MEDICINE CLINIC | Age: 68
End: 2018-08-14

## 2018-08-14 VITALS
DIASTOLIC BLOOD PRESSURE: 84 MMHG | WEIGHT: 217 LBS | TEMPERATURE: 97.4 F | HEART RATE: 83 BPM | RESPIRATION RATE: 20 BRPM | OXYGEN SATURATION: 97 % | BODY MASS INDEX: 32.14 KG/M2 | SYSTOLIC BLOOD PRESSURE: 118 MMHG | HEIGHT: 69 IN

## 2018-08-14 VITALS — HEIGHT: 70 IN | BODY MASS INDEX: 31.07 KG/M2 | WEIGHT: 217 LBS

## 2018-08-14 DIAGNOSIS — R35.0 FREQUENCY OF URINATION: ICD-10-CM

## 2018-08-14 DIAGNOSIS — Z00.00 ENCOUNTER FOR ANNUAL PHYSICAL EXAM: Primary | ICD-10-CM

## 2018-08-14 DIAGNOSIS — E11.42 WELL CONTROLLED TYPE 2 DIABETES MELLITUS WITH PERIPHERAL NEUROPATHY (HCC): ICD-10-CM

## 2018-08-14 DIAGNOSIS — E78.2 MIXED HYPERCHOLESTEROLEMIA AND HYPERTRIGLYCERIDEMIA: ICD-10-CM

## 2018-08-14 DIAGNOSIS — M1A.00X0 CHRONIC PRIMARY GOUTY ARTHRITIS: ICD-10-CM

## 2018-08-14 DIAGNOSIS — E11.9 COMPREHENSIVE DIABETIC FOOT EXAMINATION, TYPE 2 DM, ENCOUNTER FOR (HCC): ICD-10-CM

## 2018-08-14 DIAGNOSIS — I10 HYPERTENSION, WELL CONTROLLED: ICD-10-CM

## 2018-08-14 DIAGNOSIS — E55.9 HYPOVITAMINOSIS D: ICD-10-CM

## 2018-08-14 DIAGNOSIS — Z13.29 SCREENING FOR THYROID DISORDER: ICD-10-CM

## 2018-08-14 LAB
GLUCOSE POC: 149 MG/DL
HBA1C MFR BLD HPLC: 6 %

## 2018-08-14 RX ORDER — PEN NEEDLE, DIABETIC 30 GX3/16"
NEEDLE, DISPOSABLE MISCELLANEOUS
Qty: 1 PACKAGE | Refills: 11 | Status: SHIPPED | OUTPATIENT
Start: 2018-08-14

## 2018-08-14 RX ORDER — METFORMIN HYDROCHLORIDE 500 MG/1
TABLET ORAL
Qty: 180 TAB | Refills: 2 | Status: SHIPPED | OUTPATIENT
Start: 2018-08-14 | End: 2019-01-07 | Stop reason: SDUPTHER

## 2018-08-14 RX ORDER — ATORVASTATIN CALCIUM 20 MG/1
TABLET, FILM COATED ORAL
Qty: 90 TAB | Refills: 2 | Status: SHIPPED | OUTPATIENT
Start: 2018-08-14 | End: 2019-01-07 | Stop reason: SDUPTHER

## 2018-08-14 RX ORDER — ALLOPURINOL 300 MG/1
TABLET ORAL
Qty: 90 TAB | Refills: 2 | Status: SHIPPED | OUTPATIENT
Start: 2018-08-14 | End: 2019-05-01 | Stop reason: SDUPTHER

## 2018-08-14 NOTE — MR AVS SNAPSHOT
[address] [facility] [address] [facility] 
[phone] Patient: Maribeth Blanchard. MRN: [id number] Visit Information Date & Time Provider Department Dept. Phone Encounter #  
 8/14/2018 11:30 AM Janice Fletcher MD [facility] at [address] [id number] Follow-up Instructions Return in about 4 months (around 12/14/2018), or if symptoms worsen or fail to improve, for routine follow up. Upcoming Health Maintenance Date Due  
 FOOT EXAM Q1 11/4/1962 EYE EXAM RETINAL OR DILATED Q1 11/4/1962 ZOSTER VACCINE AGE 60> 9/4/2012 HEMOGLOBIN A1C Q6M 8/23/2017 GLAUCOMA SCREENING Q2Y 11/4/2017 Pneumococcal 65+ Low/Medium Risk (1 of 2 - PCV13) 11/4/2017 MICROALBUMIN Q1 2/23/2018 LIPID PANEL Q1 2/23/2018 Influenza Age 5 to Adult 8/1/2018 COLONOSCOPY 11/4/2026 DTaP/Tdap/Td series (2 - Td) 2/23/2027 Allergies as of 8/14/2018  Review Complete On: 8/14/2018 By: Janice Fletcher MD  
 No Known Allergies Current Immunizations  Never Reviewed No immunizations on file. Not reviewed this visit You Were Diagnosed With   
  
 Codes Comments Encounter for annual physical exam    -  Primary ICD-10-CM: Z00.00 ICD-9-CM: V70.0 Hypovitaminosis D     ICD-10-CM: E55.9 ICD-9-CM: 268.9 Mixed hypercholesterolemia and hypertriglyceridemia     ICD-10-CM: E78.2 ICD-9-CM: 272.2 Hypertension, well controlled     ICD-10-CM: I10 
ICD-9-CM: 401.9 Obesity, Class II, BMI 35-39.9, with comorbidity     ICD-10-CM: E66.9 ICD-9-CM: 278.00 Comprehensive diabetic foot examination, type 2 DM, encounter for [facility])     ICD-10-CM: E11.9 ICD-9-CM: 250.00 Well controlled type 2 diabetes mellitus with peripheral neuropathy ([facility] [address]     ICD-10-CM: E11.42 
ICD-9-CM: 250.60, 357.2 Chronic primary gouty arthritis     ICD-10-CM: M1A. 00X0 ICD-9-CM: 274.02   
 Screening for thyroid disorder     ICD-10-CM: Z13.29 ICD-9-CM: V77.0 Frequency of urination     ICD-10-CM: R35.0 ICD-9-CM: 788.41 Vitals BP Pulse Temp Resp Height(growth percentile) Weight(growth percentile) 118/84 83 97.4 °F (36.3 °C) (Oral) 20 5' 8.5\" (1.74 m) 217 lb (98.4 kg) SpO2 BMI Smoking Status 97% 32.51 kg/m2 Never Smoker Vitals History BMI and BSA Data Body Mass Index Body Surface Area 32.51 kg/m 2 2.18 m 2 Preferred Pharmacy Pharmacy Name Phone [facility] [address] [phone] Your Updated Medication List  
  
   
This list is accurate as of 8/14/18 12:28 PM.  Always use your most recent med list.  
  
  
  
  
 allopurinol 300 mg tablet Commonly known as:  ZYLOPRIM  
TAKE 1 TABLET BY MOUTH  DAILY TO PREVENT GOUT  ATTACKS  
  
 aspirin 325 mg tablet Commonly known as:  aspirin Take 1 Tab by mouth daily. atorvastatin 20 mg tablet Commonly known as:  LIPITOR  
TAKE 1 TABLET BY MOUTH  DAILY. cholecalciferol (VITAMIN D3) 5,000 unit Tab tablet Commonly known as:  VITAMIN D3 Take 1 Tab by mouth daily. glucose blood VI test strips strip Commonly known as:  ONETOUCH ULTRA TEST Check blood sugar once a day Insulin Needles (Disposable) 31 gauge x 5/16\" Ndle One touch meter  
  
 metFORMIN 500 mg tablet Commonly known as:  GLUCOPHAGE Take 1 tablet by mouth two  times daily for diabetes  mellitus Prescriptions Sent to Pharmacy Refills  
 atorvastatin (LIPITOR) 20 mg tablet 2 Sig: TAKE 1 TABLET BY MOUTH  DAILY. Class: Normal  
 Pharmacy: [address] [place] [address] Ph #: [phone]  
 allopurinol (ZYLOPRIM) 300 mg tablet 2 Sig: TAKE 1 TABLET BY MOUTH  DAILY TO PREVENT GOUT  ATTACKS  Class: Normal  
 Pharmacy: [address] Ph #: [phone]  
 glucose blood VI test strips (ONETOUCH ULTRA TEST) strip 3 Sig: Check blood sugar once a day Class: Normal  
 Pharmacy: [address] [place] [address] Ph #: [phone] Insulin Needles, Disposable, 31 gauge x 5/16\" ndle 11 Sig: One touch meter Class: Normal  
 Pharmacy: [address] [place] [address] Ph #: [phone]  
 metFORMIN (GLUCOPHAGE) 500 mg tablet 2 Sig: Take 1 tablet by mouth two  times daily for diabetes  mellitus Class: Normal  
 Pharmacy: [address] [place] [address] Ph #: [phone] We Performed the Following AMB POC GLUCOSE BLOOD, BY GLUCOSE MONITORING DEVICE [44315 CPT(R)] AMB POC HEMOGLOBIN A1C [85923 CPT(R)] CBC WITH AUTOMATED DIFF [15217 CPT(R)] LIPID PANEL [90063 CPT(R)] METABOLIC PANEL, COMPREHENSIVE [49594 CPT(R)] MICROALBUMIN, UR, RAND W/ MICROALB/CREAT RATIO [id number] CPT(R)] REFERRAL TO PODIATRY [REF90 Custom] Comments:  
 Please evaluate patient for diabetic foot exam  
 TSH 3RD GENERATION [28313 CPT(R)] URINALYSIS W/ RFLX MICROSCOPIC [36520 CPT(R)] VITAMIN D, 25 HYDROXY [id number] CPT(R)] Follow-up Instructions Return in about 4 months (around 12/14/2018), or if symptoms worsen or fail to improve, for routine follow up. Referral Information Referral ID Referred By Referred To  
  
 [id number] [facility], [address] [address] Visits Status Start Date End Date 1 New Request 8/14/18 8/14/19 If your referral has a status of pending review or denied, additional information will be sent to support the outcome of this decision. Patient Instructions Body Mass Index: Care Instructions Your Care Instructions Body mass index (BMI) can help you see if your weight is raising your risk for health problems. It uses a formula to compare how much you weigh with how tall you are. · A BMI lower than 18.5 is considered underweight. · A BMI between 18.5 and 24.9 is considered healthy. · A BMI between 25 and 29.9 is considered overweight. A BMI of 30 or higher is considered obese. If your BMI is in the normal range, it means that you have a lower risk for weight-related health problems. If your BMI is in the overweight or obese range, you may be at increased risk for weight-related health problems, such as high blood pressure, heart disease, stroke, arthritis or joint pain, and diabetes. If your BMI is in the underweight range, you may be at increased risk for health problems such as fatigue, lower protection (immunity) against illness, muscle loss, bone loss, hair loss, and hormone problems. BMI is just one measure of your risk for weight-related health problems. You may be at higher risk for health problems if you are not active, you eat an unhealthy diet, or you drink too much alcohol or use tobacco products. Follow-up care is a key part of your treatment and safety. Be sure to make and go to all appointments, and call your doctor if you are having problems. It's also a good idea to know your test results and keep a list of the medicines you take. How can you care for yourself at home? · Practice healthy eating habits. This includes eating plenty of fruits, vegetables, whole grains, lean protein, and low-fat dairy. · If your doctor recommends it, get more exercise. Walking is a good choice. Bit by bit, increase the amount you walk every day. Try for at least 30 minutes on most days of the week. · Do not smoke. Smoking can increase your risk for health problems. If you need help quitting, talk to your doctor about stop-smoking programs and medicines. These can increase your chances of quitting for good. · Limit alcohol to 2 drinks a day for men and 1 drink a day for women.  Too much alcohol can cause health problems. If you have a BMI higher than 25 · Your doctor may do other tests to check your risk for weight-related health problems. This may include measuring the distance around your waist. A waist measurement of more than 40 inches in men or 35 inches in women can increase the risk of weight-related health problems. · Talk with your doctor about steps you can take to stay healthy or improve your health. You may need to make lifestyle changes to lose weight and stay healthy, such as changing your diet and getting regular exercise. If you have a BMI lower than 18.5 · Your doctor may do other tests to check your risk for health problems. · Talk with your doctor about steps you can take to stay healthy or improve your health. You may need to make lifestyle changes to gain or maintain weight and stay healthy, such as getting more healthy foods in your diet and doing exercises to build muscle. Where can you learn more? Go to http://lyla-mavis.info/. Enter S176 in the search box to learn more about \"Body Mass Index: Care Instructions. \" Current as of: October 13, 2016 Content Version: 11.4 © 5117-2456 [employer]. Care instructions adapted under license by [employer] (which disclaims liability or warranty for this information). If you have questions about a medical condition or this instruction, always ask your healthcare professional. [address] any warranty or liability for your use of this information. Introducing [facility]! Cordell Del Real introduces [employer] patient portal. Now you can access parts of your medical record, email your doctor's office, and request medication refills online. 1. In your internet browser, go to [email or web address]: turn off the lights. [email or web address]: turn off the lights 2. Click on the First Time User? Click Here link in the Sign In box. You will see the New Member Sign Up page. 3. Enter your ZenDay Access Code exactly as it appears below. You will not need to use this code after youve completed the sign-up process. If you do not sign up before the expiration date, you must request a new code. · ZenDay Access Code: [email or web address] Expires: 11/12/2018 11:52 AM 
 
4. Enter the last four digits of your Social Security Number (xxxx) and Date of Birth (mm/dd/yyyy) as indicated and click Submit. You will be taken to the next sign-up page. 5. Create a ZenDay ID. This will be your ZenDay login ID and cannot be changed, so think of one that is secure and easy to remember. 6. Create a ZenDay password. You can change your password at any time. 7. Enter your Password Reset Question and Answer. This can be used at a later time if you forget your password. 8. Enter your e-mail address. You will receive e-mail notification when new information is available in [address]. 9. Click Sign Up. You can now view and download portions of your medical record. 10. Click the Download Summary menu link to download a portable copy of your medical information. If you have questions, please visit the Frequently Asked Questions section of the ZenDay website. Remember, ZenDay is NOT to be used for urgent needs. For medical emergencies, dial 911. Now available from your iPhone and Android! Please provide this summary of care documentation to your next provider. Your primary care clinician is listed as Celi Cardoso. If you have any questions after today's visit, please call [phone].

## 2018-08-14 NOTE — PROGRESS NOTES
Chief Complaint   Patient presents with    Medication Refill     HPI:  Heena Platt is a 74 y.o.  male hypertension, hypercholesterolemia, diabetes type 2 presents routine follow up    Cardiovascular Review:  Patient has hypertension and hyperlipidemia. Blood pressure is at goal on current medication  Diet and Lifestyle: generally follows a low fat low cholesterol diet, generally follows a low sodium diet  Home BP Monitoring: is not measured at home. Patient has been taking medications as instructed, no medication side effects noted, no chest pain on exertion, no dyspnea on exertion, no swelling of ankles. Diabetes Mellitus Review:  He has diabetes mellitus. A1C=6%  Diabetic ROS - medication compliance: compliant all of the time, diabetic diet compliance: compliant all of the time, home glucose monitoring: is performed.    Known diabetic complications: none  Cardiovascular risk factors: family history, dyslipidemia, diabetes mellitus, obesity, hypertension  Current diabetic medications include oral   Eye exam current (within one year): yes  Weight trend: stable  Prior visit with dietician: no  Current diet: \"healthy\" diet  in general  Current exercise: walking  Current monitoring regimen: home blood tests - daily  Home blood sugar records: trend: stable     Review of Systems  As per hpi    Past Medical History:   Diagnosis Date    Diabetes ([address]     Gout 3/24/2014    HTN (hypertension) 3/24/2014    Hypercholesterolemia     Obesity, Class II, BMI 35-39.9, with comorbidity 3/24/2014    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 No pertinent family history. Current Outpatient Prescriptions   Medication Sig Dispense Refill    cholecalciferol, VITAMIN D3, (VITAMIN D3) 5,000 unit tab tablet Take 1 Tab by mouth daily. 90 Tab 1    allopurinol (ZYLOPRIM) 300 mg tablet TAKE 1 TABLET BY MOUTH  DAILY TO PREVENT GOUT  ATTACKS 90 Tab 2    metFORMIN (GLUCOPHAGE) 500 mg tablet Take 1 tablet by mouth two  times daily for diabetes  mellitus 180 Tab 2    atorvastatin (LIPITOR) 20 mg tablet TAKE 1 TABLET BY MOUTH  DAILY. 90 Tab 2    aspirin (ASPIRIN) 325 mg tablet Take 1 Tab by mouth daily.  90 Tab 1    glucose blood VI test strips (ONETOUCH ULTRA TEST) strip Check blood sugar once a day 50 Strip 3     No Known Allergies    Objective:  Visit Vitals    /84    Pulse 83    Temp 97.4 °F (36.3 °C) (Oral)    Resp 20    Ht 5' 8.5\" (1.74 m)    Wt 217 lb (98.4 kg)    SpO2 97%    BMI 32.51 kg/m2     Physical Exam:   General appearance - alert, well appearing in no distress  EYE-PERRL, EOMI  Chest - clear to auscultation, no wheezes, rales or rhonchi  Heart - normal rate, regular rhythm, normal blood pressure  Abdomen - soft, nontender, nondistended, no organomegaly  Ext-peripheral pulses normal, no pedal edema  Neuro -alert, oriented, normal speech, no focal findings  Neck-normal C-spine, no tenderness, full ROM without pain  Feet-no nail deformities or callus formation with good pulses noted    Results for orders placed or performed in visit on 02/23/17   URINALYSIS W/ RFLX MICROSCOPIC   Result Value Ref Range    Specific Gravity 1.026 1.005 - 1.030    pH (UA) 5.5 5.0 - 7.5    Color Yellow Yellow    Appearance Clear Clear    Leukocyte Esterase Negative Negative    Protein Negative Negative/Trace    Glucose Negative Negative    Ketone Negative Negative    Blood Negative Negative    Bilirubin Negative Negative Urobilinogen 0.2 0.2 - 1.0 mg/dL    Nitrites Negative Negative    Microscopic Examination Comment    MICROALBUMIN, UR, RAND W/ MICROALBUMIN/CREA RATIO   Result Value Ref Range    Creatinine, urine 174.7 Not Estab. mg/dL    Microalbumin, urine 7.0 Not Estab. ug/mL    Microalb/Creat ratio (ug/mg creat.) 4.0 0.0 - 30.0 mg/g creat   CBC W/O DIFF   Result Value Ref Range    WBC 9.0 3.4 - 10.8 x10E3/uL    RBC 5.40 4. 14 - 5.80 x10E6/uL    HGB 16.1 12.6 - 17.7 g/dL    HCT 46.4 37.5 - 51.0 %    MCV 86 79 - 97 fL    MCH 29.8 26.6 - 33.0 pg    MCHC 34.7 31.5 - 35.7 g/dL    RDW 13.8 12.3 - 15.4 %    PLATELET 809 214 - 327 R66O1/YX   METABOLIC PANEL, COMPREHENSIVE   Result Value Ref Range    Glucose 107 (H) 65 - 99 mg/dL    BUN 11 8 - 27 mg/dL    Creatinine 1.12 0.76 - 1.27 mg/dL    GFR est non-AA 69 >59 mL/min/1.73    GFR est AA 80 >59 mL/min/1.73    BUN/Creatinine ratio 10 10 - 22    Sodium 142 134 - 144 mmol/L    Potassium 4.5 3.5 - 5.2 mmol/L    Chloride 101 96 - 106 mmol/L    CO2 25 18 - 29 mmol/L    Calcium 9.4 8.6 - 10.2 mg/dL    Protein, total 7.2 6.0 - 8.5 g/dL    Albumin 4.3 3.6 - 4.8 g/dL    GLOBULIN, TOTAL 2.9 1.5 - 4.5 g/dL    A-G Ratio 1.5 1.1 - 2.5    Bilirubin, total 0.5 0.0 - 1.2 mg/dL    Alk.  phosphatase 127 (H) 39 - 117 IU/L    AST (SGOT) 19 0 - 40 IU/L    ALT (SGPT) 24 0 - 44 IU/L   TSH 3RD GENERATION   Result Value Ref Range    TSH 2.740 0.450 - 4.500 uIU/mL   LIPID PANEL   Result Value Ref Range    Cholesterol, total 192 100 - 199 mg/dL    Triglyceride 253 (H) 0 - 149 mg/dL    HDL Cholesterol 32 (L) >39 mg/dL    VLDL, calculated 51 (H) 5 - 40 mg/dL    LDL, calculated 109 (H) 0 - 99 mg/dL   VITAMIN D, 25 HYDROXY   Result Value Ref Range    VITAMIN D, 25-HYDROXY 12.7 (L) 30.0 - 100.0 ng/mL   PROSTATE SPECIFIC AG (PSA)   Result Value Ref Range    Prostate Specific Ag 4.5 (H) 0.0 - 4.0 ng/mL   AMB POC HEMOGLOBIN A1C   Result Value Ref Range    Hemoglobin A1c (POC) 6.5 %   AMB POC GLUCOSE BLOOD, BY GLUCOSE MONITORING DEVICE   Result Value Ref Range    Glucose  mg/dL     Assessment/Plan:  Diagnoses and all orders for this visit:    Encounter for annual physical exam  -     CBC WITH AUTOMATED DIFF  -     METABOLIC PANEL, COMPREHENSIVE  -     Cancel: REFERRAL TO PODIATRY    Hypovitaminosis D  -     VITAMIN D, 25 HYDROXY    Mixed hypercholesterolemia and hypertriglyceridemia  -     atorvastatin (LIPITOR) 20 mg tablet; TAKE 1 TABLET BY MOUTH  DAILY., Normal, Disp-90 Tab, R-2  -     LIPID PANEL    Hypertension, well controlled  -     METABOLIC PANEL, COMPREHENSIVE    Obesity, Class II, BMI 35-39.9, with comorbidity  -     LIPID PANEL    Comprehensive diabetic foot examination, type 2 DM, encounter for ([facility]  -     REFERRAL TO PODIATRY    Well controlled type 2 diabetes mellitus with peripheral neuropathy (HCC)  -     AMB POC GLUCOSE BLOOD, BY GLUCOSE MONITORING DEVICE  -     AMB POC HEMOGLOBIN A1C  -     glucose blood VI test strips (ONETOUCH ULTRA TEST) strip; Check blood sugar once a day, Normal, Disp-50 Strip, R-3  -     Insulin Needles, Disposable, 31 gauge x 5/16\" ndle; One touch meter, Normal, Disp-1 Package, R-11  -     metFORMIN (GLUCOPHAGE) 500 mg tablet; Take 1 tablet by mouth two  times daily for diabetes  mellitus, Normal, Disp-180 Tab, R-2  -     METABOLIC PANEL, COMPREHENSIVE  -     MICROALBUMIN, UR, RAND W/ MICROALB/CREAT RATIO    Chronic primary gouty arthritis  -     allopurinol (ZYLOPRIM) 300 mg tablet; TAKE 1 TABLET BY MOUTH  DAILY TO PREVENT GOUT  ATTACKS, Normal, Disp-90 Tab, R-2    Screening for thyroid disorder  -     TSH 3RD GENERATION    Frequency of urination  -     URINALYSIS W/ RFLX MICROSCOPIC      Patient Instructions        Body Mass Index: Care Instructions  Your Care Instructions    Body mass index (BMI) can help you see if your weight is raising your risk for health problems. It uses a formula to compare how much you weigh with how tall you are.   · A BMI lower than 18.5 is considered underweight. · A BMI between 18.5 and 24.9 is considered healthy. · A BMI between 25 and 29.9 is considered overweight. A BMI of 30 or higher is considered obese. If your BMI is in the normal range, it means that you have a lower risk for weight-related health problems. If your BMI is in the overweight or obese range, you may be at increased risk for weight-related health problems, such as high blood pressure, heart disease, stroke, arthritis or joint pain, and diabetes. If your BMI is in the underweight range, you may be at increased risk for health problems such as fatigue, lower protection (immunity) against illness, muscle loss, bone loss, hair loss, and hormone problems. BMI is just one measure of your risk for weight-related health problems. You may be at higher risk for health problems if you are not active, you eat an unhealthy diet, or you drink too much alcohol or use tobacco products. Follow-up care is a key part of your treatment and safety. Be sure to make and go to all appointments, and call your doctor if you are having problems. It's also a good idea to know your test results and keep a list of the medicines you take. How can you care for yourself at home? · Practice healthy eating habits. This includes eating plenty of fruits, vegetables, whole grains, lean protein, and low-fat dairy. · If your doctor recommends it, get more exercise. Walking is a good choice. Bit by bit, increase the amount you walk every day. Try for at least 30 minutes on most days of the week. · Do not smoke. Smoking can increase your risk for health problems. If you need help quitting, talk to your doctor about stop-smoking programs and medicines. These can increase your chances of quitting for good. · Limit alcohol to 2 drinks a day for men and 1 drink a day for women. Too much alcohol can cause health problems.   If you have a BMI higher than 25  · Your doctor may do other tests to check your risk for weight-related health problems. This may include measuring the distance around your waist. A waist measurement of more than 40 inches in men or 35 inches in women can increase the risk of weight-related health problems. · Talk with your doctor about steps you can take to stay healthy or improve your health. You may need to make lifestyle changes to lose weight and stay healthy, such as changing your diet and getting regular exercise. If you have a BMI lower than 18.5  · Your doctor may do other tests to check your risk for health problems. · Talk with your doctor about steps you can take to stay healthy or improve your health. You may need to make lifestyle changes to gain or maintain weight and stay healthy, such as getting more healthy foods in your diet and doing exercises to build muscle. Where can you learn more? Go to http://lyla-mavis.info/. Enter S176 in the search box to learn more about \"Body Mass Index: Care Instructions. \"  Current as of: October 13, 2016  Content Version: 11.4  © 8706-2034 [employer] Care instructions adapted under license by [employer] (which disclaims liability or warranty for this information). If you have questions about a medical condition or this instruction, always ask your healthcare professional. [place] [address] any warranty or liability for your use of this information. Follow-up Disposition:  Return in about 4 months (around 12/14/2018), or if symptoms worsen or fail to improve, for routine follow up.

## 2018-08-14 NOTE — PATIENT INSTRUCTIONS
Body Mass Index: Care Instructions  Your Care Instructions    Body mass index (BMI) can help you see if your weight is raising your risk for health problems. It uses a formula to compare how much you weigh with how tall you are. · A BMI lower than 18.5 is considered underweight. · A BMI between 18.5 and 24.9 is considered healthy. · A BMI between 25 and 29.9 is considered overweight. A BMI of 30 or higher is considered obese. If your BMI is in the normal range, it means that you have a lower risk for weight-related health problems. If your BMI is in the overweight or obese range, you may be at increased risk for weight-related health problems, such as high blood pressure, heart disease, stroke, arthritis or joint pain, and diabetes. If your BMI is in the underweight range, you may be at increased risk for health problems such as fatigue, lower protection (immunity) against illness, muscle loss, bone loss, hair loss, and hormone problems. BMI is just one measure of your risk for weight-related health problems. You may be at higher risk for health problems if you are not active, you eat an unhealthy diet, or you drink too much alcohol or use tobacco products. Follow-up care is a key part of your treatment and safety. Be sure to make and go to all appointments, and call your doctor if you are having problems. It's also a good idea to know your test results and keep a list of the medicines you take. How can you care for yourself at home? · Practice healthy eating habits. This includes eating plenty of fruits, vegetables, whole grains, lean protein, and low-fat dairy. · If your doctor recommends it, get more exercise. Walking is a good choice. Bit by bit, increase the amount you walk every day. Try for at least 30 minutes on most days of the week. · Do not smoke. Smoking can increase your risk for health problems. If you need help quitting, talk to your doctor about stop-smoking programs and medicines. These can increase your chances of quitting for good. · Limit alcohol to 2 drinks a day for men and 1 drink a day for women. Too much alcohol can cause health problems. If you have a BMI higher than 25  · Your doctor may do other tests to check your risk for weight-related health problems. This may include measuring the distance around your waist. A waist measurement of more than 40 inches in men or 35 inches in women can increase the risk of weight-related health problems. · Talk with your doctor about steps you can take to stay healthy or improve your health. You may need to make lifestyle changes to lose weight and stay healthy, such as changing your diet and getting regular exercise. If you have a BMI lower than 18.5  · Your doctor may do other tests to check your risk for health problems. · Talk with your doctor about steps you can take to stay healthy or improve your health. You may need to make lifestyle changes to gain or maintain weight and stay healthy, such as getting more healthy foods in your diet and doing exercises to build muscle. Where can you learn more? Go to http://lyla-mavis.info/. Enter S176 in the search box to learn more about \"Body Mass Index: Care Instructions. \"  Current as of: October 13, 2016  Content Version: 11.4  © 4480-5766 Healthwise, Incorporated. Care instructions adapted under license by [employer] (which disclaims liability or warranty for this information). If you have questions about a medical condition or this instruction, always ask your healthcare professional. [address] any warranty or liability for your use of this information.

## 2018-11-30 ENCOUNTER — OFFICE VISIT (OUTPATIENT)
Dept: FAMILY MEDICINE CLINIC | Age: 68
End: 2018-11-30

## 2018-11-30 VITALS
OXYGEN SATURATION: 98 % | SYSTOLIC BLOOD PRESSURE: 110 MMHG | WEIGHT: 220 LBS | RESPIRATION RATE: 20 BRPM | BODY MASS INDEX: 32.58 KG/M2 | HEART RATE: 83 BPM | DIASTOLIC BLOOD PRESSURE: 75 MMHG | HEIGHT: 69 IN | TEMPERATURE: 97 F

## 2018-11-30 DIAGNOSIS — Z23 ENCOUNTER FOR IMMUNIZATION: ICD-10-CM

## 2018-11-30 DIAGNOSIS — Z01.00 DIABETIC EYE EXAM (HCC): ICD-10-CM

## 2018-11-30 DIAGNOSIS — E11.9 ENCOUNTER FOR DIABETIC FOOT EXAM (HCC): ICD-10-CM

## 2018-11-30 DIAGNOSIS — E11.9 DIABETIC EYE EXAM (HCC): ICD-10-CM

## 2018-11-30 DIAGNOSIS — E11.42 WELL CONTROLLED TYPE 2 DIABETES MELLITUS WITH PERIPHERAL NEUROPATHY (HCC): Primary | ICD-10-CM

## 2018-11-30 LAB
GLUCOSE POC: 178 MG/DL
HBA1C MFR BLD HPLC: 6.5 %

## 2018-11-30 NOTE — PATIENT INSTRUCTIONS
Stop taking the atorvastatin due to muscle weakness and pain Vaccine Information Statement Pneumococcal Polysaccharide Vaccine: What You Need to Know Many Vaccine Information Statements are available in English and other languages. See www.immunize.org/vis. Hojas de información Sobre Vacunas están disponibles en español y en muchos otros idiomas. Visite Sanket.si. 1. Why get vaccinated? Vaccination can protect older adults (and some children and younger adults) from pneumococcal disease. Pneumococcal disease is caused by bacteria that can spread from person to person through close contact. It can cause ear infections, and it can also lead to more serious infections of the: 
 Lungs (pneumonia),  Blood (bacteremia), and 
 Covering of the brain and spinal cord (meningitis). Meningitis can cause deafness and brain damage, and it can be fatal.   
 
Anyone can get pneumococcal disease, but children under 3years of age, people with certain medical conditions, adults over 72years of age, and cigarette smokers are at the highest risk. About 18,000 older adults die each year from pneumococcal disease in the [place]. Treatment of pneumococcal infections with penicillin and other drugs used to be more effective. But some strains of the disease have become resistant to these drugs. This makes prevention of the disease, through vaccination, even more important. 2. Pneumococcal polysaccharide vaccine (PPSV23) Pneumococcal polysaccharide vaccine (PPSV23) protects against 23 types of pneumococcal bacteria. It will not prevent all pneumococcal disease. PPSV23 is recommended for:  All adults 72years of age and older,  Anyone 2 through 59years of age with certain long-term health problems, 
 Anyone 2 through 59years of age with a weakened immune system, 
 Adults 23 through 59years of age who smoke cigarettes or have asthma. Most people need only one dose of PPSV. A second dose is recommended for certain high-risk groups. People 72 and older should get a dose even if they have gotten one or more doses of the vaccine before they turned 65. Your healthcare provider can give you more information about these recommendations. Most healthy adults develop protection within 2 to 3 weeks of getting the shot. 3. Some people should not get this vaccine  Anyone who has had a life-threatening allergic reaction to PPSV should not get another dose.  Anyone who has a severe allergy to any component of PPSV should not receive it. Tell your provider if you have any severe allergies.  Anyone who is moderately or severely ill when the shot is scheduled may be asked to wait until they recover before getting the vaccine. Someone with a mild illness can usually be vaccinated.  Children less than 3years of age should not receive this vaccine.  There is no evidence that PPSV is harmful to either a pregnant woman or to her fetus. However, as a precaution, women who need the vaccine should be vaccinated before becoming pregnant, if possible. 4. Risks of a vaccine reaction With any medicine, including vaccines, there is a chance of side effects. These are usually mild and go away on their own, but serious reactions are also possible. About half of people who get PPSV have mild side effects, such as redness or pain where the shot is given, which go away within about two days. Less than 1 out of 100 people develop a fever, muscle aches, or more severe local reactions. Problems that could happen after any vaccine:  People sometimes faint after a medical procedure, including vaccination. Sitting or lying down for about 15 minutes can help prevent fainting, and injuries caused by a fall. Tell your doctor if you feel dizzy, or have vision changes or ringing in the ears.  Some people get severe pain in the shoulder and have difficulty moving the arm where a shot was given. This happens very rarely.  Any medication can cause a severe allergic reaction. Such reactions from a vaccine are very rare, estimated at about 1 in a million doses, and would happen within a few minutes to a few hours after the vaccination. As with any medicine, there is a very remote chance of a vaccine causing a serious injury or death. The safety of vaccines is always being monitored. For more information, visit: www.cdc.gov/vaccinesafety/  
 
5. What if there is a serious reaction? What should I look for? Look for anything that concerns you, such as signs of a severe allergic reaction, very high fever, or unusual behavior. Signs of a severe allergic reaction can include hives, swelling of the face and throat, difficulty breathing, a fast heartbeat, dizziness, and weakness. These would usually start a few minutes to a few hours after the vaccination. What should I do? If you think it is a severe allergic reaction or other emergency that cant wait, call 9-1-1 or get to the nearest hospital. Otherwise, call your doctor. Afterward, the reaction should be reported to the Vaccine Adverse Event Reporting System (VAERS). Your doctor might file this report, or you can do it yourself through the VAERS web site at www.vaers. [facility].gov, or by calling 8-265.313.4590. VAERS does not give medical advice. 6. How can I learn more?  Ask your doctor. He or she can give you the vaccine package insert or suggest other sources of information.  Call your local or state health department.  Contact the Centers for Disease Control and Prevention (CDC): 
- Call 1-202.718.1890 (1-800-CDC-INFO) or 
- Visit CDCs website at www.cdc.gov/vaccines Vaccine Information Statement PPSV  
04/24/2015 Department of [facility] and [employer] Services Centers for Disease Control and Prevention Office Use Only

## 2018-11-30 NOTE — PROGRESS NOTES
Chief Complaint Patient presents with  Muscle Pain HPI: 
Rajeev Soto. is a 79 y.o.  male with hypertension, hypercholesterolemia, diabetes type 2 presents with complaints of muscle pain and weakness. Patient says his wife noticed that he appears tired these days and also because he keeps complaining of muscle aches she advised him to come for evaluation. Labs ordered during last vis have been done. Patient's medications have been reviewed, plan is to stop atorvastatin and observe for improvement. Also advised to get blood work done. Review of Systems As per hpi Past Medical History:  
Diagnosis Date  Diabetes ([address]  Gout 3/24/2014  
 HTN (hypertension) 3/24/2014  Hypercholesterolemia  Obesity, Class II, BMI 35-39.9, with comorbidity 3/24/2014  Partial unilateral paresis ([address] 3/24/2014 Past Surgical History:  
Procedure Laterality Date  HX ORTHOPAEDIC    
 repair left eye socket Social History Socioeconomic History  Marital status:  Spouse name: Not on file  Number of children: Not on file  Years of education: Not on file  Highest education level: Not on file Tobacco Use  Smoking status: Never Smoker  Smokeless tobacco: Never Used Substance and Sexual Activity  Alcohol use: No  
 Drug use: No  
 
History reviewed. No pertinent family history. Current Outpatient Medications Medication Sig Dispense Refill  atorvastatin (LIPITOR) 20 mg tablet TAKE 1 TABLET BY MOUTH  DAILY.  90 Tab 2  
 allopurinol (ZYLOPRIM) 300 mg tablet TAKE 1 TABLET BY MOUTH  DAILY TO PREVENT GOUT  ATTACKS 90 Tab 2  
 glucose blood VI test strips (ONETOUCH ULTRA TEST) strip Check blood sugar once a day 50 Strip 3  
 Insulin Needles, Disposable, 31 gauge x 5/16\" ndle One touch meter 1 Package 11  
 metFORMIN (GLUCOPHAGE) 500 mg tablet Take 1 tablet by mouth two  times daily for diabetes  mellitus 180 Tab 2  
  cholecalciferol, VITAMIN D3, (VITAMIN D3) 5,000 unit tab tablet Take 1 Tab by mouth daily. 90 Tab 1  
 aspirin (ASPIRIN) 325 mg tablet Take 1 Tab by mouth daily. 90 Tab 1 No Known Allergies Objective: 
Visit Vitals /75 Pulse 83 Temp 97 °F (36.1 °C) (Oral) Resp 20 Ht 5' 8.5\" (1.74 m) Wt 220 lb (99.8 kg) SpO2 98% BMI 32.96 kg/m² Physical Exam:  
General appearance - alert, oriented x 3, well appearing in no distress EYE-PERRL, EOMI,sclera anicteric Neck - supple, no significant adenopathy Chest - clear to auscultation, no wheezes, rales or rhonchi Heart - normal rate, regular rhythm, normal blood pressure Abdomen - soft, nontender, nondistended, no organomegaly Ext-peripheral pulses normal, no pedal edema Neuro -alert, oriented, normal speech, no focal findings Back-full range of motion, no tenderness, palpable spasm or pain on motion Foot: normal pulses, normal vibration sensation, no calluses, normal filament test 
 
Results for orders placed or performed in visit on 11/30/18 AMB POC HEMOGLOBIN A1C Result Value Ref Range Hemoglobin A1c (POC) 6.5 % AMB POC GLUCOSE BLOOD, BY GLUCOSE MONITORING DEVICE Result Value Ref Range Glucose  mg/dL Assessment/Plan: 
Diagnoses and all orders for this visit: 
 
Well controlled type 2 diabetes mellitus with peripheral neuropathy ([facility] -     AMB POC HEMOGLOBIN A1C 
-     AMB POC GLUCOSE BLOOD, BY GLUCOSE MONITORING DEVICE Encounter for immunization 
-     varicella-zoster recombinant, PF, (SHINGRIX, PF,) 50 mcg/0.5 mL susr injection; 0.5 mL by IntraMUSCular route once for 1 dose., Print, Disp-0.5 mL, R-0 
-     PNEUMOCOCCAL CONJ VACCINE 13 VALENT IM 
-     OH IMMUNIZ ADMIN,1 SINGLE/COMB VAC/TOXOID Diabetic eye exam ([facility] 
-     REFERRAL TO OPHTHALMOLOGY Encounter for diabetic foot exam ([facility] 
-      DIABETES FOOT EXAM 
 
 
Patient Instructions Stop taking the atorvastatin due to muscle weakness and pain Follow-up Disposition: 
Return 4-6 weeks, for f/u muscle weakness and pain.

## 2018-12-01 LAB
25(OH)D3+25(OH)D2 SERPL-MCNC: 24.6 NG/ML (ref 30–100)
ALBUMIN SERPL-MCNC: 4.3 G/DL (ref 3.6–4.8)
ALBUMIN/CREAT UR: 3.8 MG/G CREAT (ref 0–30)
ALBUMIN/GLOB SERPL: 1.5 {RATIO} (ref 1.2–2.2)
ALP SERPL-CCNC: 109 IU/L (ref 39–117)
ALT SERPL-CCNC: 17 IU/L (ref 0–44)
APPEARANCE UR: CLEAR
AST SERPL-CCNC: 16 IU/L (ref 0–40)
BASOPHILS # BLD AUTO: 0 X10E3/UL (ref 0–0.2)
BASOPHILS NFR BLD AUTO: 0 %
BILIRUB SERPL-MCNC: 0.6 MG/DL (ref 0–1.2)
BILIRUB UR QL STRIP: NEGATIVE
BUN SERPL-MCNC: 13 MG/DL (ref 8–27)
BUN/CREAT SERPL: 12 (ref 10–24)
CALCIUM SERPL-MCNC: 9.4 MG/DL (ref 8.6–10.2)
CHLORIDE SERPL-SCNC: 102 MMOL/L (ref 96–106)
CHOLEST SERPL-MCNC: 158 MG/DL (ref 100–199)
CO2 SERPL-SCNC: 20 MMOL/L (ref 20–29)
COLOR UR: YELLOW
CREAT SERPL-MCNC: 1.08 MG/DL (ref 0.76–1.27)
CREAT UR-MCNC: 373 MG/DL
EOSINOPHIL # BLD AUTO: 0.3 X10E3/UL (ref 0–0.4)
EOSINOPHIL NFR BLD AUTO: 3 %
ERYTHROCYTE [DISTWIDTH] IN BLOOD BY AUTOMATED COUNT: 13.8 % (ref 12.3–15.4)
GLOBULIN SER CALC-MCNC: 2.9 G/DL (ref 1.5–4.5)
GLUCOSE SERPL-MCNC: 129 MG/DL (ref 65–99)
GLUCOSE UR QL: NEGATIVE
HCT VFR BLD AUTO: 44.6 % (ref 37.5–51)
HDLC SERPL-MCNC: 34 MG/DL
HGB BLD-MCNC: 15.4 G/DL (ref 13–17.7)
HGB UR QL STRIP: NEGATIVE
IMM GRANULOCYTES # BLD: 0 X10E3/UL (ref 0–0.1)
IMM GRANULOCYTES NFR BLD: 0 %
KETONES UR QL STRIP: NEGATIVE
LDLC SERPL CALC-MCNC: 67 MG/DL (ref 0–99)
LEUKOCYTE ESTERASE UR QL STRIP: NEGATIVE
LYMPHOCYTES # BLD AUTO: 2.9 X10E3/UL (ref 0.7–3.1)
LYMPHOCYTES NFR BLD AUTO: 32 %
MCH RBC QN AUTO: 30.5 PG (ref 26.6–33)
MCHC RBC AUTO-ENTMCNC: 34.5 G/DL (ref 31.5–35.7)
MCV RBC AUTO: 88 FL (ref 79–97)
MICRO URNS: ABNORMAL
MICROALBUMIN UR-MCNC: 14 UG/ML
MONOCYTES # BLD AUTO: 0.7 X10E3/UL (ref 0.1–0.9)
MONOCYTES NFR BLD AUTO: 8 %
NEUTROPHILS # BLD AUTO: 5.2 X10E3/UL (ref 1.4–7)
NEUTROPHILS NFR BLD AUTO: 57 %
NITRITE UR QL STRIP: NEGATIVE
PH UR STRIP: 5 [PH] (ref 5–7.5)
PLATELET # BLD AUTO: 224 X10E3/UL (ref 150–379)
POTASSIUM SERPL-SCNC: 4.4 MMOL/L (ref 3.5–5.2)
PROT SERPL-MCNC: 7.2 G/DL (ref 6–8.5)
PROT UR QL STRIP: NEGATIVE
RBC # BLD AUTO: 5.05 X10E6/UL (ref 4.14–5.8)
SODIUM SERPL-SCNC: 140 MMOL/L (ref 134–144)
SP GR UR: >=1.03 (ref 1–1.03)
TRIGL SERPL-MCNC: 286 MG/DL (ref 0–149)
TSH SERPL DL<=0.005 MIU/L-ACNC: 3.78 UIU/ML (ref 0.45–4.5)
UROBILINOGEN UR STRIP-MCNC: 0.2 MG/DL (ref 0.2–1)
VLDLC SERPL CALC-MCNC: 57 MG/DL (ref 5–40)
WBC # BLD AUTO: 9.2 X10E3/UL (ref 3.4–10.8)

## 2019-01-07 ENCOUNTER — OFFICE VISIT (OUTPATIENT)
Dept: FAMILY MEDICINE CLINIC | Age: 69
End: 2019-01-07

## 2019-01-07 VITALS
WEIGHT: 220 LBS | OXYGEN SATURATION: 98 % | RESPIRATION RATE: 20 BRPM | DIASTOLIC BLOOD PRESSURE: 83 MMHG | SYSTOLIC BLOOD PRESSURE: 120 MMHG | HEART RATE: 88 BPM | TEMPERATURE: 98.3 F | HEIGHT: 69 IN | BODY MASS INDEX: 32.58 KG/M2

## 2019-01-07 DIAGNOSIS — M79.2 NEUROPATHIC PAIN: ICD-10-CM

## 2019-01-07 DIAGNOSIS — Z13.5 GLAUCOMA SCREENING: Primary | ICD-10-CM

## 2019-01-07 DIAGNOSIS — Z01.00 DIABETIC EYE EXAM (HCC): ICD-10-CM

## 2019-01-07 DIAGNOSIS — E55.9 HYPOVITAMINOSIS D: ICD-10-CM

## 2019-01-07 DIAGNOSIS — E11.42 WELL CONTROLLED TYPE 2 DIABETES MELLITUS WITH PERIPHERAL NEUROPATHY (HCC): ICD-10-CM

## 2019-01-07 DIAGNOSIS — E78.2 MIXED HYPERCHOLESTEROLEMIA AND HYPERTRIGLYCERIDEMIA: ICD-10-CM

## 2019-01-07 DIAGNOSIS — E11.9 DIABETIC EYE EXAM (HCC): ICD-10-CM

## 2019-01-07 RX ORDER — GABAPENTIN 100 MG/1
100 CAPSULE ORAL 3 TIMES DAILY
Qty: 60 CAP | Refills: 1 | Status: SHIPPED | OUTPATIENT
Start: 2019-01-07 | End: 2019-03-08 | Stop reason: SDUPTHER

## 2019-01-07 RX ORDER — CHOLECALCIFEROL TAB 125 MCG (5000 UNIT) 125 MCG
5000 TAB ORAL DAILY
Qty: 90 TAB | Refills: 1 | Status: SHIPPED | OUTPATIENT
Start: 2019-01-07 | End: 2019-07-31 | Stop reason: SDUPTHER

## 2019-01-07 RX ORDER — ATORVASTATIN CALCIUM 20 MG/1
TABLET, FILM COATED ORAL
Qty: 90 TAB | Refills: 2 | Status: SHIPPED | OUTPATIENT
Start: 2019-01-07 | End: 2019-09-13 | Stop reason: SDUPTHER

## 2019-01-07 RX ORDER — METFORMIN HYDROCHLORIDE 500 MG/1
TABLET ORAL
Qty: 180 TAB | Refills: 2 | Status: SHIPPED | OUTPATIENT
Start: 2019-01-07 | End: 2019-05-13 | Stop reason: DRUGHIGH

## 2019-01-07 NOTE — LETTER
1/7/2019 8:51 AM 
 
 Damonlora Ma. R [facility] [address] [place] [id number] Dear Damonlora Ma.: 
 
Please find your most recent results below. Triglyceride is elevated, vit d level has improved but still bel normal, All other results are within normallimits Resulted Orders VITAMIN D, 25 HYDROXY Result Value Ref Range VITAMIN D, 25-HYDROXY 24.6 (L) 30.0 - 100.0 ng/mL Comment:  
   Vitamin D deficiency has been defined by the [facility] practice guideline as a 
level of serum 25-OH vitamin D less than 20 ng/mL (1,2). The Endocrine Society went on to further define vitamin D 
insufficiency as a level between 21 and 29 ng/mL (2). 1. IOM ([place] of Medicine). 2010. Dietary reference 
   intakes for calcium and D. [address] [place] [address]: The 
   Andre Phillipe. 2. Cayden MF, Sin NC, Jaime SANTOYO, et al. 
   Evaluation, treatment, and prevention of vitamin D 
   deficiency: an Endocrine Society clinical practice 
   guideline. JCEM. 2011 Jul; 96(7):1911-30. Narrative Performed at:  [address]  [id number] : Bridget Crabtree MD, Phone:  [phone] URINALYSIS W/ RFLX MICROSCOPIC Result Value Ref Range Specific Gravity      >=1.030 (A) 1.005 - 1.030  
 pH (UA) 5.0 5.0 - 7.5 Color Yellow Yellow Appearance Clear Clear Leukocyte Esterase Negative Negative Protein Negative Negative/Trace Glucose Negative Negative Ketone Negative Negative Blood Negative Negative Bilirubin Negative Negative Urobilinogen 0.2 0.2 - 1.0 mg/dL Nitrites Negative Negative Microscopic Examination Comment Comment:  
   Microscopic not indicated and not performed. Narrative Performed at:  [address]  [id number] : Bridget Crabtree MD, Phone:  [phone] [address]  
 Result Value Ref Range TSH 3.780 0.450 - 4.500 uIU/mL Narrative Performed at:  [address]  [id number] : Chin Edgar MD, Phone:  [phone] LIPID PANEL Result Value Ref Range Cholesterol, total 158 100 - 199 mg/dL Triglyceride 286 (H) 0 - 149 mg/dL HDL Cholesterol 34 (L) >39 mg/dL VLDL, calculated 57 (H) 5 - 40 mg/dL LDL, calculated 67 0 - 99 mg/dL Narrative Performed at:  [address]  [id number] : Chin Edgar MD, Phone:  [phone] CBC WITH AUTOMATED DIFF Result Value Ref Range WBC 9.2 3.4 - 10.8 x10E3/uL  
 RBC 5.05 4.14 - 5.80 x10E6/uL HGB 15.4 13.0 - 17.7 g/dL HCT 44.6 37.5 - 51.0 % MCV 88 79 - 97 fL  
 MCH 30.5 26.6 - 33.0 pg  
 MCHC 34.5 31.5 - 35.7 g/dL  
 RDW 13.8 12.3 - 15.4 % PLATELET 283 856 - 354 x10E3/uL NEUTROPHILS 57 Not Estab. % Lymphocytes 32 Not Estab. % MONOCYTES 8 Not Estab. % EOSINOPHILS 3 Not Estab. % BASOPHILS 0 Not Estab. %  
 ABS. NEUTROPHILS 5.2 1.4 - 7.0 x10E3/uL Abs Lymphocytes 2.9 0.7 - 3.1 x10E3/uL  
 ABS. MONOCYTES 0.7 0.1 - 0.9 x10E3/uL  
 ABS. EOSINOPHILS 0.3 0.0 - 0.4 x10E3/uL  
 ABS. BASOPHILS 0.0 0.0 - 0.2 x10E3/uL IMMATURE GRANULOCYTES 0 Not Estab. %  
 ABS. IMM. GRANS. 0.0 0.0 - 0.1 x10E3/uL Narrative Performed at:  [address]  [id number] : Chin Edgar MD, Phone:  [phone] METABOLIC PANEL, COMPREHENSIVE Result Value Ref Range Glucose 129 (H) 65 - 99 mg/dL BUN 13 8 - 27 mg/dL Creatinine 1.08 0.76 - 1.27 mg/dL GFR est non-AA 71 >59 mL/min/1.73 GFR est AA 82 >59 mL/min/1.73  
 BUN/Creatinine ratio 12 10 - 24 Sodium 140 134 - 144 mmol/L Potassium 4.4 3.5 - 5.2 mmol/L Chloride 102 96 - 106 mmol/L  
 CO2 20 20 - 29 mmol/L Calcium 9.4 8.6 - 10.2 mg/dL Protein, total 7.2 6.0 - 8.5 g/dL Albumin 4.3 3.6 - 4.8 g/dL GLOBULIN, TOTAL 2.9 1.5 - 4.5 g/dL A-G Ratio 1.5 1.2 - 2.2 Bilirubin, total 0.6 0.0 - 1.2 mg/dL Alk. phosphatase 109 39 - 117 IU/L  
 AST (SGOT) 16 0 - 40 IU/L  
 ALT (SGPT) 17 0 - 44 IU/L Narrative Performed at:  [address]  [id number] : Charmayne Siskin MD, Phone:  [phone] MICROALBUMIN, UR, RAND W/ MICROALB/CREAT RATIO Result Value Ref Range Creatinine, urine 373.0 Not Estab. mg/dL Microalbumin, urine 14.0 Not Estab. ug/mL Microalb/Creat ratio (ug/mg creat.) 3.8 0.0 - 30.0 mg/g creat Comment:  
                        Normal:                0.0 -  30.0 Albuminuria:          31.0 - 300.0 Clinical albuminuria:       >300.0 Narrative Performed at:  [address]  [id number] : Charmayne Siskin MD, Phone:  [phone] RECOMMENDATIONS: 
Work on diet and exercise. Make sure you are taking 500mg of Calcium with Vitamin D twice a day. Continue with current  medications. Please call me if you have any questions: [phone] Sincerely, Jhony Sanchez MD

## 2019-01-07 NOTE — PATIENT INSTRUCTIONS
Cholesterol and Triglycerides Tests: About These Tests What are they? Cholesterol and triglycerides tests measure the amount of fats in your blood. These fats have both \"good\" (HDL) and \"bad\" (LDL) cholesterol. Why are these tests done? These tests are done to help find out your risk of a heart attack and stroke. They can help your doctor find out how well medicine is working for some health problems. How can you prepare for these tests? · Your doctor may tell you to fast before your tests. This means that you do not eat or drink anything except water for 9 to 12 hours before the tests. In most cases, you can take your medicines with water the morning of the test. 
· Do not eat high-fat foods the night before the tests. · Do not drink alcohol or do intense exercise the night before the tests. · Be sure to tell your doctor about all the over-the-counter and prescription medicines and herbs or other supplements you take. They can affect the results of these tests. What happens during these tests? A health professional takes a sample of your blood. What else should you know about these tests? Your cholesterol levels can help your doctor find out your risk for having a heart attack or stroke. But it's not just about your cholesterol. Your doctor uses your cholesterol levels plus other things to calculate your risk. These include: 
· Your blood pressure. · Whether or not you have diabetes. · Your age, sex, and race. · Whether or not you smoke. You and your doctor can talk about whether you need to lower your risk and what treatment is best for you. Where can you learn more? Go to http://lyla-mavis.info/. Enter Z241 in the search box to learn more about \"Cholesterol and Triglycerides Tests: About These Tests. \" Current as of: December 6, 2017 Content Version: 11.8 © 8673-6832 Healthwise, Incorporated.  Care instructions adapted under license by [address] (which disclaims liability or warranty for this information). If you have questions about a medical condition or this instruction, always ask your healthcare professional. [address] any warranty or liability for your use of this information.

## 2019-01-07 NOTE — PROGRESS NOTES
Chief Complaint Patient presents with  Results  
  follow up HPI: 
Paula Hodges. is a 79 y.o.  male with hypercholesterolemia, hypertension and diabetes presents for 2 month f/u to discuss result Triglyceride is elevated, vit d level has improved but still below normal level. All other results are within normal limits. A1C of 6.5% indicates good glycemic control. Results and management have been and discussed, diet and exercise encouraged. Blood pressure is at goal on current treatment Review of Systems As per hpi Past Medical History:  
Diagnosis Date  Diabetes ([address]  Gout 3/24/2014  
 HTN (hypertension) 3/24/2014  Hypercholesterolemia  Obesity, Class II, BMI 35-39.9, with comorbidity 3/24/2014  Partial unilateral paresis ([address] 3/24/2014 Past Surgical History:  
Procedure Laterality Date  HX ORTHOPAEDIC    
 repair left eye socket Social History Socioeconomic History  Marital status:  Spouse name: Not on file  Number of children: Not on file  Years of education: Not on file  Highest education level: Not on file Tobacco Use  Smoking status: Never Smoker  Smokeless tobacco: Never Used Substance and Sexual Activity  Alcohol use: No  
 Drug use: No  
 
History reviewed. No pertinent family history. Current Outpatient Medications Medication Sig Dispense Refill  atorvastatin (LIPITOR) 20 mg tablet TAKE 1 TABLET BY MOUTH  DAILY. 90 Tab 2  cholecalciferol, VITAMIN D3, (VITAMIN D3) 5,000 unit tab tablet Take 1 Tab by mouth daily. 90 Tab 1  
 metFORMIN (GLUCOPHAGE) 500 mg tablet Take 1 tablet by mouth two  times daily for diabetes  mellitus 180 Tab 2  
 allopurinol (ZYLOPRIM) 300 mg tablet TAKE 1 TABLET BY MOUTH  DAILY TO PREVENT GOUT  ATTACKS 90 Tab 2  
 glucose blood VI test strips (ONETOUCH ULTRA TEST) strip Check blood sugar once a day 50 Strip 3  Insulin Needles, Disposable, 31 gauge x 5/16\" ndle One touch meter 1 Package 11  
 aspirin (ASPIRIN) 325 mg tablet Take 1 Tab by mouth daily. 90 Tab 1 No Known Allergies Objective: 
Visit Vitals /83 Pulse 88 Temp 98.3 °F (36.8 °C) (Oral) Resp 20 Ht 5' 8.5\" (1.74 m) Wt 220 lb (99.8 kg) SpO2 98% BMI 32.96 kg/m² Physical Exam:  
General appearance - alert, well appearing in no distress Mental status - alert, oriented to person, place, and time EYE-PERRL, EOMI Neck - supple, no significant adenopathy Chest - clear to auscultation, no wheezes, rales or rhonchi Heart - normal rate, regular rhythm, normal blood pressure Abdomen - soft, nontender, nondistended, no organomegaly Ext-peripheral pulses normal, no pedal edema Neuro -alert, oriented, normal speech, no focal findings Results for orders placed or performed in visit on 11/30/18 AMB POC HEMOGLOBIN A1C Result Value Ref Range Hemoglobin A1c (POC) 6.5 % AMB POC GLUCOSE BLOOD, BY GLUCOSE MONITORING DEVICE Result Value Ref Range Glucose  mg/dL Assessment/Plan: 
Diagnoses and all orders for this visit: 
 
Glaucoma screening 
-     Cancel: REFERRAL TO OPHTHALMOLOGY 
-     REFERRAL TO OPHTHALMOLOGY Mixed hypercholesterolemia and hypertriglyceridemia 
-     atorvastatin (LIPITOR) 20 mg tablet; TAKE 1 TABLET BY MOUTH  DAILY., Normal, Disp-90 Tab, R-2 Hypovitaminosis D 
-     cholecalciferol, VITAMIN D3, (VITAMIN D3) 5,000 unit tab tablet; Take 1 Tab by mouth daily. , Normal, Disp-90 Tab, R-1 Well controlled type 2 diabetes mellitus with peripheral neuropathy (HCC) 
-     metFORMIN (GLUCOPHAGE) 500 mg tablet; Take 1 tablet by mouth two  times daily for diabetes  mellitus, Normal, Disp-180 Tab, R-2 Diabetic eye exam ([facility] [address] -     Cancel: REFERRAL TO OPHTHALMOLOGY 
-     REFERRAL TO OPHTHALMOLOGY Neuropathic pain 
-     gabapentin (NEURONTIN) 100 mg capsule;  Take 1 Cap by mouth three (3) times daily. , Normal, Disp-60 Cap, R-1 Patient Instructions Cholesterol and Triglycerides Tests: About These Tests What are they? Cholesterol and triglycerides tests measure the amount of fats in your blood. These fats have both \"good\" (HDL) and \"bad\" (LDL) cholesterol. Why are these tests done? These tests are done to help find out your risk of a heart attack and stroke. They can help your doctor find out how well medicine is working for some health problems. How can you prepare for these tests? · Your doctor may tell you to fast before your tests. This means that you do not eat or drink anything except water for 9 to 12 hours before the tests. In most cases, you can take your medicines with water the morning of the test. 
· Do not eat high-fat foods the night before the tests. · Do not drink alcohol or do intense exercise the night before the tests. · Be sure to tell your doctor about all the over-the-counter and prescription medicines and herbs or other supplements you take. They can affect the results of these tests. What happens during these tests? A health professional takes a sample of your blood. What else should you know about these tests? Your cholesterol levels can help your doctor find out your risk for having a heart attack or stroke. But it's not just about your cholesterol. Your doctor uses your cholesterol levels plus other things to calculate your risk. These include: 
· Your blood pressure. · Whether or not you have diabetes. · Your age, sex, and race. · Whether or not you smoke. You and your doctor can talk about whether you need to lower your risk and what treatment is best for you. Where can you learn more? Go to http://lyla-mavis.info/. Enter T239 in the search box to learn more about \"Cholesterol and Triglycerides Tests: About These Tests. \" Current as of: December 6, 2017 Content Version: 11.8 © 2866-6701 Healthwise, Incorporated. Care instructions adapted under license by [employer] (which disclaims liability or warranty for this information). If you have questions about a medical condition or this instruction, always ask your healthcare professional. [address] any warranty or liability for your use of this information. Follow-up Disposition: 
Return in about 4 months (around 5/7/2019), or if symptoms worsen or fail to improve, for routine follow up.

## 2019-03-08 DIAGNOSIS — M79.2 NEUROPATHIC PAIN: ICD-10-CM

## 2019-03-11 RX ORDER — GABAPENTIN 100 MG/1
CAPSULE ORAL
Qty: 60 CAP | Refills: 1 | Status: SHIPPED | OUTPATIENT
Start: 2019-03-11 | End: 2019-09-13 | Stop reason: ALTCHOICE

## 2019-05-01 DIAGNOSIS — M1A.00X0 CHRONIC PRIMARY GOUTY ARTHRITIS: ICD-10-CM

## 2019-05-02 RX ORDER — ALLOPURINOL 300 MG/1
TABLET ORAL
Qty: 90 TAB | Refills: 2 | Status: SHIPPED | OUTPATIENT
Start: 2019-05-02 | End: 2020-01-27 | Stop reason: SDUPTHER

## 2019-05-13 ENCOUNTER — OFFICE VISIT (OUTPATIENT)
Dept: FAMILY MEDICINE CLINIC | Age: 69
End: 2019-05-13

## 2019-05-13 VITALS
SYSTOLIC BLOOD PRESSURE: 119 MMHG | BODY MASS INDEX: 33.03 KG/M2 | HEART RATE: 79 BPM | OXYGEN SATURATION: 97 % | RESPIRATION RATE: 20 BRPM | WEIGHT: 223 LBS | DIASTOLIC BLOOD PRESSURE: 72 MMHG | HEIGHT: 69 IN | TEMPERATURE: 97.6 F

## 2019-05-13 DIAGNOSIS — G83.9 PARTIAL UNILATERAL PARESIS (HCC): ICD-10-CM

## 2019-05-13 DIAGNOSIS — E78.2 MIXED HYPERCHOLESTEROLEMIA AND HYPERTRIGLYCERIDEMIA: ICD-10-CM

## 2019-05-13 DIAGNOSIS — E55.9 HYPOVITAMINOSIS D: ICD-10-CM

## 2019-05-13 DIAGNOSIS — R35.0 FREQUENCY OF URINATION: ICD-10-CM

## 2019-05-13 DIAGNOSIS — E11.42 WELL CONTROLLED TYPE 2 DIABETES MELLITUS WITH PERIPHERAL NEUROPATHY (HCC): Primary | ICD-10-CM

## 2019-05-13 DIAGNOSIS — I10 HYPERTENSION, WELL CONTROLLED: ICD-10-CM

## 2019-05-13 DIAGNOSIS — Z23 ENCOUNTER FOR IMMUNIZATION: ICD-10-CM

## 2019-05-13 PROBLEM — E11.40 TYPE 2 DIABETES MELLITUS WITH DIABETIC NEUROPATHY (HCC): Status: ACTIVE | Noted: 2019-05-13

## 2019-05-13 LAB
GLUCOSE POC: 359 MG/DL
HBA1C MFR BLD HPLC: 8.8 %

## 2019-05-13 RX ORDER — METFORMIN HYDROCHLORIDE 1000 MG/1
1000 TABLET ORAL 2 TIMES DAILY WITH MEALS
Qty: 180 TAB | Refills: 0 | Status: SHIPPED | OUTPATIENT
Start: 2019-05-13 | End: 2020-01-27 | Stop reason: SDUPTHER

## 2019-05-13 NOTE — PROGRESS NOTES
Chief Complaint   Patient presents with    Hypertension     4 follow up     Cholesterol Problem    Diabetes     HPI:  Yamile Suarez is a 79 y.o.  male with h/o hypertension, hypertension, diabetes type 2(A1C=8.8%), obesity presents for 4 month follow up. Patient has been doing well. Blood pressure is at goal on current treatment. Patient has no complaints. Review of Systems  As per hpi    Past Medical History:   Diagnosis Date    Diabetes ([facility] [address]     Gout 3/24/2014    HTN (hypertension) 3/24/2014    Hypercholesterolemia     Obesity, Class II, BMI 35-39.9, with comorbidity 3/24/2014    Partial unilateral paresis ([address] 3/24/2014     Past Surgical History:   Procedure Laterality Date    HX ORTHOPAEDIC      repair left eye socket     Social History     Socioeconomic History    Marital status:      Spouse name: Not on file    Number of children: Not on file    Years of education: Not on file    Highest education level: Not on file   Tobacco Use    Smoking status: Never Smoker    Smokeless tobacco: Never Used   Substance and Sexual Activity    Alcohol use: No    Drug use: No     No family history on file. Current Outpatient Medications   Medication Sig Dispense Refill    allopurinol (ZYLOPRIM) 300 mg tablet TAKE 1 TABLET BY MOUTH DAILY TO PREVENT GOUT ATTACKS 90 Tab 2    gabapentin (NEURONTIN) 100 mg capsule TAKE 1 CAPSULE BY MOUTH THREE TIMES A DAY 60 Cap 1    atorvastatin (LIPITOR) 20 mg tablet TAKE 1 TABLET BY MOUTH  DAILY. 90 Tab 2    cholecalciferol, VITAMIN D3, (VITAMIN D3) 5,000 unit tab tablet Take 1 Tab by mouth daily.  90 Tab 1    metFORMIN (GLUCOPHAGE) 500 mg tablet Take 1 tablet by mouth two  times daily for diabetes  mellitus 180 Tab 2    glucose blood VI test strips (ONETOUCH ULTRA TEST) strip Check blood sugar once a day 50 Strip 3    Insulin Needles, Disposable, 31 gauge x 5/16\" ndle One touch meter 1 Package 11    aspirin (ASPIRIN) 325 mg tablet Take 1 Tab by mouth daily. 90 Tab 1     No Known Allergies    Objective:  Visit Vitals  /72   Pulse 79   Temp 97.6 °F (36.4 °C) (Oral)   Resp 20   Ht 5' 8.5\" (1.74 m)   Wt 223 lb (101.2 kg)   SpO2 97%   BMI 33.41 kg/m²     Physical Exam:   General appearance - alert, well appearing in no distress  Mental status - alert, oriented to person, place, and time  EYE-PERRL, EOMI  Neck - supple, no significant adenopathy   Chest - clear to auscultation, no wheezes, rales or rhonchi  Heart - normal rate, regular rhythm, normal blood pressure  Abdomen - soft, nontender, nondistended, no organomegaly  Ext-peripheral pulses normal, no pedal edema  Neuro -alert, oriented, normal speech, no focal findings  Back-full range of motion, no tenderness, palpable spasm or pain on motion     Results for orders placed or performed in visit on 05/13/19   AMB POC HEMOGLOBIN A1C   Result Value Ref Range    Hemoglobin A1c (POC) 8.8 %   AMB POC GLUCOSE BLOOD, BY GLUCOSE MONITORING DEVICE   Result Value Ref Range    Glucose  mg/dL     Assessment/Plan:  Diagnoses and all orders for this visit:    1. Well controlled type 2 diabetes mellitus with peripheral neuropathy (HCC)  -     AMB POC HEMOGLOBIN A1C  -     AMB POC GLUCOSE BLOOD, BY GLUCOSE MONITORING DEVICE  -     metFORMIN (GLUCOPHAGE) 1,000 mg tablet; Take 1 Tab by mouth two (2) times daily (with meals). -     METABOLIC PANEL, COMPREHENSIVE    2. Partial unilateral paresis ([address]    3. Encounter for immunization  -     varicella-zoster recombinant, PF, (SHINGRIX, PF,) 50 mcg/0.5 mL susr injection; 0.5 mL by IntraMUSCular route once for 1 dose. 4. Mixed hypercholesterolemia and hypertriglyceridemia  -     LIPID PANEL    5. Hypovitaminosis D  -     VITAMIN D, 25 HYDROXY    6. Hypertension, well controlled  -     METABOLIC PANEL, COMPREHENSIVE    7.  Frequency of urination  -     URINALYSIS W/ RFLX MICROSCOPIC      Patient Instructions          Diabetes Foot Health: Care Instructions  Your Care Instructions    When you have diabetes, your feet need extra care and attention. Diabetes can damage the nerve endings and blood vessels in your feet, making you less likely to notice when your feet are injured. Diabetes also limits your body's ability to fight infection and get blood to areas that need it. If you get a minor foot injury, it could become an ulcer or a serious infection. With good foot care, you can prevent most of these problems. Caring for your feet can be quick and easy. Most of the care can be done when you are bathing or getting ready for bed. Follow-up care is a key part of your treatment and safety. Be sure to make and go to all appointments, and call your doctor if you are having problems. It's also a good idea to know your test results and keep a list of the medicines you take. How can you care for yourself at home? · Keep your blood sugar close to normal by watching what and how much you eat, monitoring blood sugar, taking medicines if prescribed, and getting regular exercise. · Do not smoke. Smoking affects blood flow and can make foot problems worse. If you need help quitting, talk to your doctor about stop-smoking programs and medicines. These can increase your chances of quitting for good. · Eat a diet that is low in fats. High fat intake can cause fat to build up in your blood vessels and decrease blood flow. · Inspect your feet daily for blisters, cuts, cracks, or sores. If you cannot see well, use a mirror or have someone help you. · Take care of your feet:  ? Wash your feet every day. Use warm (not hot) water. Check the water temperature with your wrists or other part of your body, not your feet. ? Dry your feet well. Pat them dry. Do not rub the skin on your feet too hard. Dry well between your toes. If the skin on your feet stays moist, bacteria or a fungus can grow, which can lead to infection. ? Keep your skin soft.  Use moisturizing skin cream to keep the skin on your feet soft and prevent calluses and cracks. But do not put the cream between your toes, and stop using any cream that causes a rash. ? Clean underneath your toenails carefully. Do not use a sharp object to clean underneath your toenails. Use the blunt end of a nail file or other rounded tool. ? Trim and file your toenails straight across to prevent ingrown toenails. Use a nail clipper, not scissors. Use an emery board to smooth the edges. · Change socks daily. Socks without seams are best, because seams often rub the feet. You can find socks for people with diabetes from specialty catalogs. · Look inside your shoes every day for things like gravel or torn linings, which could cause blisters or sores. · Buy shoes that fit well:  ? Look for shoes that have plenty of space around the toes. This helps prevent bunions and blisters. ? Try on shoes while wearing the kind of socks you will usually wear with the shoes. ? Avoid plastic shoes. They may rub your feet and cause blisters. Good shoes should be made of materials that are flexible and breathable, such as leather or cloth. ? Break in new shoes slowly by wearing them for no more than an hour a day for several days. Take extra time to check your feet for red areas, blisters, or other problems after you wear new shoes. · Do not go barefoot. Do not wear sandals, and do not wear shoes with very thin soles. Thin soles are easy to puncture. They also do not protect your feet from hot pavement or cold weather. · Have your doctor check your feet during each visit. If you have a foot problem, see your doctor. Do not try to treat an early foot problem at home. Home remedies or treatments that you can buy without a prescription (such as corn removers) can be harmful. · Always get early treatment for foot problems. A minor irritation can lead to a major problem if not properly cared for early. When should you call for help?   Call your doctor now or seek immediate medical care if:    · You have a foot sore, an ulcer or break in the skin that is not healing after 4 days, bleeding corns or calluses, or an ingrown toenail.     · You have blue or black areas, which can mean bruising or blood flow problems.     · You have peeling skin or tiny blisters between your toes or cracking or oozing of the skin.     · You have a fever for more than 24 hours and a foot sore.     · You have new numbness or tingling in your feet that does not go away after you move your feet or change positions.     · You have unexplained or unusual swelling of the foot or ankle.    Watch closely for changes in your health, and be sure to contact your doctor if:    · You cannot do proper foot care. Where can you learn more? Go to http://lyla-mavis.info/. Enter A739 in the search box to learn more about \"Diabetes Foot Health: Care Instructions. \"  Current as of: July 25, 2018  Content Version: 11.9  © 4241-8579 [employer]. Care instructions adapted under license by [employer] (which disclaims liability or warranty for this information). If you have questions about a medical condition or this instruction, always ask your healthcare professional. [place] [address] any warranty or liability for your use of this information. Follow-up and Dispositions    · Return 3-4 months, for routine follow up.

## 2019-05-15 LAB
25(OH)D3+25(OH)D2 SERPL-MCNC: 35.6 NG/ML (ref 30–100)
ALBUMIN SERPL-MCNC: 4.4 G/DL (ref 3.6–4.8)
ALBUMIN/GLOB SERPL: 1.5 {RATIO} (ref 1.2–2.2)
ALP SERPL-CCNC: 125 IU/L (ref 39–117)
ALT SERPL-CCNC: 31 IU/L (ref 0–44)
APPEARANCE UR: CLEAR
AST SERPL-CCNC: 22 IU/L (ref 0–40)
BILIRUB SERPL-MCNC: 0.6 MG/DL (ref 0–1.2)
BILIRUB UR QL STRIP: NEGATIVE
BUN SERPL-MCNC: 14 MG/DL (ref 8–27)
BUN/CREAT SERPL: 15 (ref 10–24)
CALCIUM SERPL-MCNC: 9.9 MG/DL (ref 8.6–10.2)
CHLORIDE SERPL-SCNC: 98 MMOL/L (ref 96–106)
CHOLEST SERPL-MCNC: 168 MG/DL (ref 100–199)
CO2 SERPL-SCNC: 23 MMOL/L (ref 20–29)
COLOR UR: YELLOW
CREAT SERPL-MCNC: 0.94 MG/DL (ref 0.76–1.27)
GLOBULIN SER CALC-MCNC: 2.9 G/DL (ref 1.5–4.5)
GLUCOSE SERPL-MCNC: 158 MG/DL (ref 65–99)
GLUCOSE UR QL: NEGATIVE
HDLC SERPL-MCNC: 36 MG/DL
HGB UR QL STRIP: NEGATIVE
KETONES UR QL STRIP: NEGATIVE
LDLC SERPL CALC-MCNC: 62 MG/DL (ref 0–99)
LEUKOCYTE ESTERASE UR QL STRIP: NEGATIVE
MICRO URNS: NORMAL
NITRITE UR QL STRIP: NEGATIVE
PH UR STRIP: 5 [PH] (ref 5–7.5)
POTASSIUM SERPL-SCNC: 4.1 MMOL/L (ref 3.5–5.2)
PROT SERPL-MCNC: 7.3 G/DL (ref 6–8.5)
PROT UR QL STRIP: NEGATIVE
SODIUM SERPL-SCNC: 136 MMOL/L (ref 134–144)
SP GR UR: 1.03 (ref 1–1.03)
TRIGL SERPL-MCNC: 350 MG/DL (ref 0–149)
UROBILINOGEN UR STRIP-MCNC: 0.2 MG/DL (ref 0.2–1)
VLDLC SERPL CALC-MCNC: 70 MG/DL (ref 5–40)

## 2019-07-31 DIAGNOSIS — E55.9 HYPOVITAMINOSIS D: ICD-10-CM

## 2019-07-31 RX ORDER — RESVER/WINE/BFL/GRPSD/PC/C/POM 200MG-60MG
CAPSULE ORAL
Qty: 90 TAB | Refills: 1 | Status: SHIPPED | OUTPATIENT
Start: 2019-07-31 | End: 2020-01-27 | Stop reason: SDUPTHER

## 2019-09-13 ENCOUNTER — OFFICE VISIT (OUTPATIENT)
Dept: FAMILY MEDICINE CLINIC | Age: 69
End: 2019-09-13

## 2019-09-13 VITALS
SYSTOLIC BLOOD PRESSURE: 118 MMHG | HEIGHT: 69 IN | HEART RATE: 91 BPM | WEIGHT: 216 LBS | DIASTOLIC BLOOD PRESSURE: 76 MMHG | TEMPERATURE: 97.6 F | OXYGEN SATURATION: 97 % | BODY MASS INDEX: 31.99 KG/M2 | RESPIRATION RATE: 20 BRPM

## 2019-09-13 DIAGNOSIS — M54.2 NECK PAIN: ICD-10-CM

## 2019-09-13 DIAGNOSIS — R35.0 FREQUENCY OF URINATION: ICD-10-CM

## 2019-09-13 DIAGNOSIS — E78.2 MIXED HYPERCHOLESTEROLEMIA AND HYPERTRIGLYCERIDEMIA: ICD-10-CM

## 2019-09-13 DIAGNOSIS — I10 HYPERTENSION, WELL CONTROLLED: ICD-10-CM

## 2019-09-13 DIAGNOSIS — E11.42 WELL CONTROLLED TYPE 2 DIABETES MELLITUS WITH PERIPHERAL NEUROPATHY (HCC): Primary | ICD-10-CM

## 2019-09-13 DIAGNOSIS — E55.9 HYPOVITAMINOSIS D: ICD-10-CM

## 2019-09-13 DIAGNOSIS — M25.511 BILATERAL SHOULDER PAIN, UNSPECIFIED CHRONICITY: ICD-10-CM

## 2019-09-13 DIAGNOSIS — M25.512 BILATERAL SHOULDER PAIN, UNSPECIFIED CHRONICITY: ICD-10-CM

## 2019-09-13 DIAGNOSIS — Z23 ENCOUNTER FOR IMMUNIZATION: ICD-10-CM

## 2019-09-13 RX ORDER — ATORVASTATIN CALCIUM 20 MG/1
TABLET, FILM COATED ORAL
Qty: 90 TAB | Refills: 2 | Status: SHIPPED | OUTPATIENT
Start: 2019-09-13 | End: 2020-01-27 | Stop reason: SDUPTHER

## 2019-09-13 NOTE — PATIENT INSTRUCTIONS
Type 2 Diabetes: Care Instructions  Your Care Instructions    Type 2 diabetes is a disease that develops when the body's tissues cannot use insulin properly. Over time, the pancreas cannot make enough insulin. Insulin is a hormone that helps the body's cells use sugar (glucose) for energy. It also helps the body store extra sugar in muscle, fat, and liver cells. Without insulin, the sugar cannot get into the cells to do its work. It stays in the blood instead. This can cause high blood sugar levels. A person has diabetes when the blood sugar stays too high too much of the time. Over time, diabetes can lead to diseases of the heart, blood vessels, nerves, kidneys, and eyes. You may be able to control your blood sugar by losing weight, eating a healthy diet, and getting daily exercise. You may also have to take insulin or other diabetes medicine. Follow-up care is a key part of your treatment and safety. Be sure to make and go to all appointments. Call your doctor if you are having problems. It's also a good idea to know your test results and keep a list of the medicines you take. How can you care for yourself at home? · Keep your blood sugar at a target level (which you set with your doctor). ? Eat a good diet that spreads carbohydrate throughout the day. Carbohydratethe body's main source of fuelaffects blood sugar more than any other nutrient. Carbohydrate is in fruits, vegetables, milk, and yogurt. It also is in breads, cereals, vegetables such as potatoes and corn, and sugary foods such as candy and cakes. ? Aim for 30 minutes of exercise on most, preferably all, days of the week. Walking is a good choice. You also may want to do other activities, such as running, swimming, cycling, or playing tennis or team sports. If your doctor says it's okay, do muscle-strengthening exercises at least 2 times a week. ? Take your medicines exactly as prescribed.  Call your doctor if you think you are having a problem with your medicine. You will get more details on the specific medicines your doctor prescribes. · Check your blood sugar as often as your doctor recommends. It is important to keep track of any symptoms you have, such as low blood sugar. Also tell your doctor if you have any changes in your activities, diet, or insulin use. · Talk to your doctor before you start taking aspirin every day. Aspirin can help certain people lower their risk of a heart attack or stroke. But taking aspirin isn't right for everyone, because it can cause serious bleeding. · Do not smoke. If you need help quitting, talk to your doctor about stop-smoking programs and medicines. These can increase your chances of quitting for good. · Keep your cholesterol and blood pressure at normal levels. You may need to take one or more medicines to reach your goals. Take them exactly as directed. Do not stop or change a medicine without talking to your doctor first.  When should you call for help? Call 911 anytime you think you may need emergency care. For example, call if:    · You passed out (lost consciousness), or you suddenly become very sleepy or confused. (You may have very low blood sugar.)    Call your doctor now or seek immediate medical care if:    · Your blood sugar is 300 mg/dL or is higher than the level your doctor has set for you.     · You have symptoms of low blood sugar, such as:  ? Sweating. ? Feeling nervous, shaky, and weak. ? Extreme hunger and slight nausea. ? Dizziness and headache.  ? Blurred vision. ? Confusion.    Watch closely for changes in your health, and be sure to contact your doctor if:    · You often have problems controlling your blood sugar.     · You have symptoms of long-term diabetes problems, such as:  ? New vision changes. ? New pain, numbness, or tingling in your hands or feet. ? Skin problems. Where can you learn more? Go to http://lyla-mavis.info/.   Enter C553 in the search box to learn more about \"Type 2 Diabetes: Care Instructions. \"  Current as of: July 25, 2018  Content Version: 12.1  © 3822-4085 Healthwise, Incorporated. Care instructions adapted under license by [employer] (which disclaims liability or warranty for this information). If you have questions about a medical condition or this instruction, always ask your healthcare professional. [address] any warranty or liability for your use of this information.

## 2019-09-13 NOTE — PROGRESS NOTES
Chief Complaint   Patient presents with    Follow-up     4 month     Neck Pain    Shoulder Pain     HPI:  Macario Proctor is a 79 y.o. male with h/o hypertension, hypercholesterolemia, diabetes type 2(A1C=8.8%), obesity presents for 4 month follow up. Patient has been doing well. Blood pressure is at goal on current treatment. Patient has complaints neck and shoulder pain. Denies recent injury, numbness and tingling. These are chronic due to arthritis. 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 Current Outpatient Medications   Medication Sig Dispense Refill    atorvastatin (LIPITOR) 20 mg tablet TAKE 1 TABLET BY MOUTH  DAILY. 90 Tab 2    varicella-zoster recombinant, PF, (SHINGRIX, PF,) 50 mcg/0.5 mL susr injection 0.5 mL by IntraMUSCular route once for 1 dose. 0.5 mL 0    VITAMIN D3 5,000 unit tab tablet TAKE 1 TABLET BY MOUTH EVERY DAY 90 Tab 1    metFORMIN (GLUCOPHAGE) 1,000 mg tablet Take 1 Tab by mouth two (2) times daily (with meals).  180 Tab 0    allopurinol (ZYLOPRIM) 300 mg tablet TAKE 1 TABLET BY MOUTH DAILY TO PREVENT GOUT ATTACKS 90 Tab 2    glucose blood VI test strips (ONETOUCH ULTRA TEST) strip Check blood sugar once a day 50 Strip 3    Insulin Needles, Disposable, 31 gauge x 5/16\" ndle One touch meter 1 Package 11    aspirin (ASPIRIN) 325 mg tablet Take 1 Tab by mouth daily. 90 Tab 1     No Known Allergies    Objective:  Visit Vitals  /76   Pulse 91   Temp 97.6 °F (36.4 °C) (Oral)   Resp 20   Ht 5' 8.5\" (1.74 m)   Wt 216 lb (98 kg)   SpO2 97%   BMI 32.36 kg/m²     Physical Exam:   General appearance - alert, well appearing in no distress  Mental status - alert, oriented to person, place, and time  EYE-PERRL, EOMI  ENT-ENT exam normal, no neck nodes or sinus tenderness  Mouth - mucous membranes moist, pharynx normal without lesions  Neck - supple, no significant adenopathy   Chest - clear to auscultation, no wheezes, rales or rhonchi  Heart - normal rate, regular rhythm, normal blood pressure  Abdomen - soft, nontender, nondistended, no organomegaly  Ext-peripheral pulses normal, no pedal edema   Skin-Warm and dry. no hyperpigmentation or suspicious lesions  Neuro -no focal findings   Shoulder-normal exam, no swelling, tenderness, instability; ligaments intact, FROM shoulder joint  Neck- normal C-spine, no tenderness, full ROM without pain      Results for orders placed or performed in visit on 87/92/08   METABOLIC PANEL, COMPREHENSIVE   Result Value Ref Range    Glucose 158 (H) 65 - 99 mg/dL    BUN 14 8 - 27 mg/dL    Creatinine 0.94 0.76 - 1.27 mg/dL    GFR est non-AA 84 >59 mL/min/1.73    GFR est AA 97 >59 mL/min/1.73    BUN/Creatinine ratio 15 10 - 24    Sodium 136 134 - 144 mmol/L    Potassium 4.1 3.5 - 5.2 mmol/L    Chloride 98 96 - 106 mmol/L    CO2 23 20 - 29 mmol/L    Calcium 9.9 8.6 - 10.2 mg/dL    Protein, total 7.3 6.0 - 8.5 g/dL    Albumin 4.4 3.6 - 4.8 g/dL    GLOBULIN, TOTAL 2.9 1.5 - 4.5 g/dL    A-G Ratio 1.5 1.2 - 2.2    Bilirubin, total 0.6 0.0 - 1.2 mg/dL    Alk.  phosphatase 125 (H) 39 - 117 IU/L    AST (SGOT) 22 0 - 40 IU/L    ALT (SGPT) 31 0 - 44 IU/L   LIPID PANEL   Result Value Ref Range    Cholesterol, total 168 100 - 199 mg/dL    Triglyceride 350 (H) 0 - 149 mg/dL    HDL Cholesterol 36 (L) >39 mg/dL    VLDL, calculated 70 (H) 5 - 40 mg/dL    LDL, calculated 62 0 - 99 mg/dL   VITAMIN D, 25 HYDROXY   Result Value Ref Range    VITAMIN D, 25-HYDROXY 35.6 30.0 - 100.0 ng/mL   URINALYSIS W/ RFLX MICROSCOPIC   Result Value Ref Range    Specific Gravity 1.026 1.005 - 1.030    pH (UA) 5.0 5.0 - 7.5    Color Yellow Yellow    Appearance Clear Clear    Leukocyte Esterase Negative Negative    Protein Negative Negative/Trace    Glucose Negative Negative    Ketone Negative Negative    Blood Negative Negative    Bilirubin Negative Negative    Urobilinogen 0.2 0.2 - 1.0 mg/dL    Nitrites Negative Negative    Microscopic Examination Comment    AMB POC HEMOGLOBIN A1C   Result Value Ref Range    Hemoglobin A1c (POC) 8.8 %   AMB POC GLUCOSE BLOOD, BY GLUCOSE MONITORING DEVICE   Result Value Ref Range    Glucose  mg/dL       Assessment/Plan:  Diagnoses and all orders for this visit:    Well controlled type 2 diabetes mellitus with peripheral neuropathy (HCC)  -     METABOLIC PANEL, COMPREHENSIVE  -     HEMOGLOBIN A1C WITH EAG    Mixed hypercholesterolemia and hypertriglyceridemia  -     atorvastatin (LIPITOR) 20 mg tablet; TAKE 1 TABLET BY MOUTH  DAILY., Normal, Disp-90 Tab, R-2  -     LIPID PANEL    Encounter for immunization  -     varicella-zoster recombinant, PF, (SHINGRIX, PF,) 50 mcg/0.5 mL susr injection; 0.5 mL by IntraMUSCular route once for 1 dose., Normal, Disp-0.5 mL, R-0    Hypertension, well controlled  -     METABOLIC PANEL, COMPREHENSIVE    Frequency of urination  -     URINALYSIS W/ RFLX MICROSCOPIC    Hypovitaminosis D  -     VITAMIN D, 25 HYDROXY    Neck pain  -     diclofenac EC (VOLTAREN) 75 mg EC tablet; 1 tab by mouth 2 times a day with meals  Indications: joint damage causing pain and loss of function, Normal, Disp-60 Tab, R-3    Bilateral shoulder pain, unspecified chronicity  -     diclofenac EC (VOLTAREN) 75 mg EC tablet; 1 tab by mouth 2 times a day with meals  Indications: joint damage causing pain and loss of function, Normal, Disp-60 Tab, R-3    Other orders  -     Cancel: ZOSTER VACC RECOMBINANT ADJUVANTED      Patient Instructions          Type 2 Diabetes: Care Instructions  Your Care Instructions    Type 2 diabetes is a disease that develops when the body's tissues cannot use insulin properly. Over time, the pancreas cannot make enough insulin. Insulin is a hormone that helps the body's cells use sugar (glucose) for energy. It also helps the body store extra sugar in muscle, fat, and liver cells. Without insulin, the sugar cannot get into the cells to do its work. It stays in the blood instead. This can cause high blood sugar levels. A person has diabetes when the blood sugar stays too high too much of the time. Over time, diabetes can lead to diseases of the heart, blood vessels, nerves, kidneys, and eyes. You may be able to control your blood sugar by losing weight, eating a healthy diet, and getting daily exercise. You may also have to take insulin or other diabetes medicine. Follow-up care is a key part of your treatment and safety. Be sure to make and go to all appointments. Call your doctor if you are having problems. It's also a good idea to know your test results and keep a list of the medicines you take. How can you care for yourself at home? · Keep your blood sugar at a target level (which you set with your doctor). ? Eat a good diet that spreads carbohydrate throughout the day. Carbohydratethe body's main source of fuelaffects blood sugar more than any other nutrient. Carbohydrate is in fruits, vegetables, milk, and yogurt. It also is in breads, cereals, vegetables such as potatoes and corn, and sugary foods such as candy and cakes. ? Aim for 30 minutes of exercise on most, preferably all, days of the week. Walking is a good choice.  You also may want to do other activities, such as running, swimming, cycling, or playing tennis or team sports. If your doctor says it's okay, do muscle-strengthening exercises at least 2 times a week. ? Take your medicines exactly as prescribed. Call your doctor if you think you are having a problem with your medicine. You will get more details on the specific medicines your doctor prescribes. · Check your blood sugar as often as your doctor recommends. It is important to keep track of any symptoms you have, such as low blood sugar. Also tell your doctor if you have any changes in your activities, diet, or insulin use. · Talk to your doctor before you start taking aspirin every day. Aspirin can help certain people lower their risk of a heart attack or stroke. But taking aspirin isn't right for everyone, because it can cause serious bleeding. · Do not smoke. If you need help quitting, talk to your doctor about stop-smoking programs and medicines. These can increase your chances of quitting for good. · Keep your cholesterol and blood pressure at normal levels. You may need to take one or more medicines to reach your goals. Take them exactly as directed. Do not stop or change a medicine without talking to your doctor first.  When should you call for help? Call 911 anytime you think you may need emergency care. For example, call if:    · You passed out (lost consciousness), or you suddenly become very sleepy or confused. (You may have very low blood sugar.)    Call your doctor now or seek immediate medical care if:    · Your blood sugar is 300 mg/dL or is higher than the level your doctor has set for you.     · You have symptoms of low blood sugar, such as:  ? Sweating. ? Feeling nervous, shaky, and weak. ? Extreme hunger and slight nausea. ? Dizziness and headache.  ? Blurred vision. ?  Confusion.    Watch closely for changes in your health, and be sure to contact your doctor if:    · You often have problems controlling your blood sugar.     · You have symptoms of long-term diabetes problems, such as:  ? New vision changes. ? New pain, numbness, or tingling in your hands or feet. ? Skin problems. Where can you learn more? Go to http://lyla-mavis.info/. Enter C553 in the search box to learn more about \"Type 2 Diabetes: Care Instructions. \"  Current as of: July 25, 2018  Content Version: 12.1  © 0711-4739 [employer]. Care instructions adapted under license by [employer] (which disclaims liability or warranty for this information). If you have questions about a medical condition or this instruction, always ask your healthcare professional. [place] [address] any warranty or liability for your use of this information. Follow-up and Dispositions    · Return 3-4 month follow up result.

## 2019-09-14 LAB
25(OH)D3+25(OH)D2 SERPL-MCNC: 28.6 NG/ML (ref 30–100)
ALBUMIN SERPL-MCNC: 4.1 G/DL (ref 3.6–4.8)
ALBUMIN/GLOB SERPL: 1.5 {RATIO} (ref 1.2–2.2)
ALP SERPL-CCNC: 152 IU/L (ref 39–117)
ALT SERPL-CCNC: 36 IU/L (ref 0–44)
APPEARANCE UR: CLEAR
AST SERPL-CCNC: 36 IU/L (ref 0–40)
BILIRUB SERPL-MCNC: 0.6 MG/DL (ref 0–1.2)
BILIRUB UR QL STRIP: NEGATIVE
BUN SERPL-MCNC: 15 MG/DL (ref 8–27)
BUN/CREAT SERPL: 13 (ref 10–24)
CALCIUM SERPL-MCNC: 9.7 MG/DL (ref 8.6–10.2)
CHLORIDE SERPL-SCNC: 94 MMOL/L (ref 96–106)
CHOLEST SERPL-MCNC: 181 MG/DL (ref 100–199)
CO2 SERPL-SCNC: 19 MMOL/L (ref 20–29)
COLOR UR: YELLOW
CREAT SERPL-MCNC: 1.13 MG/DL (ref 0.76–1.27)
EST. AVERAGE GLUCOSE BLD GHB EST-MCNC: 269 MG/DL
GLOBULIN SER CALC-MCNC: 2.7 G/DL (ref 1.5–4.5)
GLUCOSE SERPL-MCNC: 543 MG/DL (ref 65–99)
GLUCOSE UR QL: ABNORMAL
HBA1C MFR BLD: 11 % (ref 4.8–5.6)
HDLC SERPL-MCNC: 29 MG/DL
HGB UR QL STRIP: NEGATIVE
KETONES UR QL STRIP: NEGATIVE
LDLC SERPL CALC-MCNC: ABNORMAL MG/DL (ref 0–99)
LEUKOCYTE ESTERASE UR QL STRIP: NEGATIVE
MICRO URNS: ABNORMAL
NITRITE UR QL STRIP: NEGATIVE
PH UR STRIP: 5 [PH] (ref 5–7.5)
POTASSIUM SERPL-SCNC: 4.6 MMOL/L (ref 3.5–5.2)
PROT SERPL-MCNC: 6.8 G/DL (ref 6–8.5)
PROT UR QL STRIP: NEGATIVE
SODIUM SERPL-SCNC: 132 MMOL/L (ref 134–144)
SP GR UR: >=1.03 (ref 1–1.03)
TRIGL SERPL-MCNC: 557 MG/DL (ref 0–149)
UROBILINOGEN UR STRIP-MCNC: 0.2 MG/DL (ref 0.2–1)
VLDLC SERPL CALC-MCNC: ABNORMAL MG/DL (ref 5–40)

## 2019-09-17 RX ORDER — DICLOFENAC SODIUM 75 MG/1
TABLET, DELAYED RELEASE ORAL
Qty: 60 TAB | Refills: 3 | Status: SHIPPED | OUTPATIENT
Start: 2019-09-17

## 2019-10-03 ENCOUNTER — TELEPHONE (OUTPATIENT)
Dept: FAMILY MEDICINE CLINIC | Age: 69
End: 2019-10-03

## 2019-10-03 NOTE — TELEPHONE ENCOUNTER
Call patient in regards to his abnormal results. (mailbox full) patient need to set an early  appt to discuss.

## 2020-01-27 ENCOUNTER — OFFICE VISIT (OUTPATIENT)
Dept: FAMILY MEDICINE CLINIC | Age: 70
End: 2020-01-27

## 2020-01-27 VITALS
HEIGHT: 69 IN | SYSTOLIC BLOOD PRESSURE: 132 MMHG | TEMPERATURE: 97.2 F | BODY MASS INDEX: 31.7 KG/M2 | RESPIRATION RATE: 20 BRPM | DIASTOLIC BLOOD PRESSURE: 84 MMHG | OXYGEN SATURATION: 98 % | WEIGHT: 214 LBS | HEART RATE: 78 BPM

## 2020-01-27 DIAGNOSIS — E11.42 WELL CONTROLLED TYPE 2 DIABETES MELLITUS WITH PERIPHERAL NEUROPATHY (HCC): ICD-10-CM

## 2020-01-27 DIAGNOSIS — E78.2 MIXED HYPERCHOLESTEROLEMIA AND HYPERTRIGLYCERIDEMIA: ICD-10-CM

## 2020-01-27 DIAGNOSIS — I10 HYPERTENSION, WELL CONTROLLED: Primary | ICD-10-CM

## 2020-01-27 DIAGNOSIS — M1A.00X0 CHRONIC PRIMARY GOUTY ARTHRITIS: ICD-10-CM

## 2020-01-27 DIAGNOSIS — E11.9 ENCOUNTER FOR DIABETIC FOOT EXAM (HCC): ICD-10-CM

## 2020-01-27 DIAGNOSIS — E55.9 HYPOVITAMINOSIS D: ICD-10-CM

## 2020-01-27 RX ORDER — METFORMIN HYDROCHLORIDE 1000 MG/1
1000 TABLET ORAL 2 TIMES DAILY WITH MEALS
Qty: 180 TAB | Refills: 0 | Status: SHIPPED | OUTPATIENT
Start: 2020-01-27 | End: 2020-02-10 | Stop reason: SDUPTHER

## 2020-01-27 RX ORDER — ALLOPURINOL 300 MG/1
TABLET ORAL
Qty: 90 TAB | Refills: 2 | Status: SHIPPED | OUTPATIENT
Start: 2020-01-27 | End: 2020-10-22

## 2020-01-27 RX ORDER — ATORVASTATIN CALCIUM 20 MG/1
TABLET, FILM COATED ORAL
Qty: 90 TAB | Refills: 2 | Status: SHIPPED | OUTPATIENT
Start: 2020-01-27 | End: 2021-10-13 | Stop reason: SDUPTHER

## 2020-01-27 RX ORDER — CHOLECALCIFEROL TAB 125 MCG (5000 UNIT) 125 MCG
TAB ORAL
Qty: 90 TAB | Refills: 1 | Status: SHIPPED | OUTPATIENT
Start: 2020-01-27 | End: 2020-02-10 | Stop reason: SDUPTHER

## 2020-01-27 NOTE — PATIENT INSTRUCTIONS
A Healthy Lifestyle: Care Instructions Your Care Instructions A healthy lifestyle can help you feel good, stay at a healthy weight, and have plenty of energy for both work and play. A healthy lifestyle is something you can share with your whole family. A healthy lifestyle also can lower your risk for serious health problems, such as high blood pressure, heart disease, and diabetes. You can follow a few steps listed below to improve your health and the health of your family. Follow-up care is a key part of your treatment and safety. Be sure to make and go to all appointments, and call your doctor if you are having problems. It's also a good idea to know your test results and keep a list of the medicines you take. How can you care for yourself at home? · Do not eat too much sugar, fat, or fast foods. You can still have dessert and treats now and then. The goal is moderation. · Start small to improve your eating habits. Pay attention to portion sizes, drink less juice and soda pop, and eat more fruits and vegetables. ? Eat a healthy amount of food. A 3-ounce serving of meat, for example, is about the size of a deck of cards. Fill the rest of your plate with vegetables and whole grains. ? Limit the amount of soda and sports drinks you have every day. Drink more water when you are thirsty. ? Eat at least 5 servings of fruits and vegetables every day. It may seem like a lot, but it is not hard to reach this goal. A serving or helping is 1 piece of fruit, 1 cup of vegetables, or 2 cups of leafy, raw vegetables. Have an apple or some carrot sticks as an afternoon snack instead of a candy bar. Try to have fruits and/or vegetables at every meal. 
· Make exercise part of your daily routine. You may want to start with simple activities, such as walking, bicycling, or slow swimming. Try to be active 30 to 60 minutes every day.  You do not need to do all 30 to 60 minutes all at once. For example, you can exercise 3 times a day for 10 or 20 minutes. Moderate exercise is safe for most people, but it is always a good idea to talk to your doctor before starting an exercise program. 
· Keep moving. An [employer] the lawn, work in the garden, or [employer]. Take the stairs instead of the elevator at work. · If you smoke, quit. People who smoke have an increased risk for heart attack, stroke, cancer, and other lung illnesses. Quitting is hard, but there are ways to boost your chance of quitting tobacco for good. ? Use nicotine gum, patches, or lozenges. ? Ask your doctor about stop-smoking programs and medicines. ? Keep trying. In addition to reducing your risk of diseases in the future, you will notice some benefits soon after you stop using tobacco. If you have shortness of breath or asthma symptoms, they will likely get better within a few weeks after you quit. · Limit how much alcohol you drink. Moderate amounts of alcohol (up to 2 drinks a day for men, 1 drink a day for women) are okay. But drinking too much can lead to liver problems, high blood pressure, and other health problems. Family health If you have a family, there are many things you can do together to improve your health. · Eat meals together as a family as often as possible. · Eat healthy foods. This includes fruits, vegetables, lean meats and dairy, and whole grains. · Include your family in your fitness plan. Most people think of activities such as jogging or tennis as the way to fitness, but there are many ways you and your family can be more active. Anything that makes you breathe hard and gets your heart pumping is exercise. Here are some tips: 
? Walk to do errands or to take your child to school or the bus. 
? Go for a family bike ride after dinner instead of watching TV. Where can you learn more? Go to http://lyla-mavis.info/. Enter R974 in the search box to learn more about \"A Healthy Lifestyle: Care Instructions. \" Current as of: May 28, 2019 Content Version: 12.2 © 3298-7498 [employer]. Care instructions adapted under license by [employer] (which disclaims liability or warranty for this information). If you have questions about a medical condition or this instruction, always ask your healthcare professional. [place] [address] any warranty or liability for your use of this information.

## 2020-01-27 NOTE — PROGRESS NOTES
Chief Complaint   Patient presents with    Follow-up     4 month      HPI:  Manny Zarate. is a 81 y.o.  male with h/o hypertension, hypertension, diabetes type 2(A1C=8.8%), obesity presents for 4 month follow up. Patient has been doing well. Blood pressure is at goal. Patient has no complaints. .    Review of Systems  As per hpi    Past Medical History:   Diagnosis Date    Diabetes ([facility] [address]     Gout 3/24/2014    HTN (hypertension) 3/24/2014    Hypercholesterolemia     Obesity, Class II, BMI 35-39.9, with comorbidity 3/24/2014    Partial unilateral paresis ([facility] [address] 3/24/2014     Past Surgical History:   Procedure Laterality Date    HX ORTHOPAEDIC      repair left eye socket     Social History     Socioeconomic History    Marital status:      Spouse name: Not on file    Number of children: Not on file    Years of education: Not on file    Highest education level: Not on file   Tobacco Use    Smoking status: Never Smoker    Smokeless tobacco: Never Used   Substance and Sexual Activity    Alcohol use: No    Drug use: No     No family history on file. Current Outpatient Medications   Medication Sig Dispense Refill    diclofenac EC (VOLTAREN) 75 mg EC tablet 1 tab by mouth 2 times a day with meals  Indications: joint damage causing pain and loss of function 60 Tab 3    atorvastatin (LIPITOR) 20 mg tablet TAKE 1 TABLET BY MOUTH  DAILY. 90 Tab 2    VITAMIN D3 5,000 unit tab tablet TAKE 1 TABLET BY MOUTH EVERY DAY 90 Tab 1    metFORMIN (GLUCOPHAGE) 1,000 mg tablet Take 1 Tab by mouth two (2) times daily (with meals). 180 Tab 0    allopurinol (ZYLOPRIM) 300 mg tablet TAKE 1 TABLET BY MOUTH DAILY TO PREVENT GOUT ATTACKS 90 Tab 2    glucose blood VI test strips (ONETOUCH ULTRA TEST) strip Check blood sugar once a day 50 Strip 3    Insulin Needles, Disposable, 31 gauge x 5/16\" ndle One touch meter 1 Package 11    aspirin (ASPIRIN) 325 mg tablet Take 1 Tab by mouth daily.  90 Tab 1 No Known Allergies    Objective:  Visit Vitals  /84   Pulse 78   Temp 97.2 °F (36.2 °C) (Oral)   Resp 20   Ht 5' 8.5\" (1.74 m)   Wt 214 lb (97.1 kg)   SpO2 98%   BMI 32.07 kg/m²     Physical Exam:   General appearance - alert, well appearing in no distress  Mental status - alert, oriented to person, place, and time  EYE-PERRL, EOMI  Chest - clear to auscultation, no wheezes, rales or rhonchi  Heart - normal rate, regular rhythm, normal blood pressure  Abdomen - soft, nontender, nondistended, no organomegaly  Ext-peripheral pulses normal, no pedal edema  Neuro -alert, oriented, normal speech, no focal findings   Back-full range of motion, no tenderness, palpable spasm or pain on motion     Results for orders placed or performed in visit on 09/13/19   LIPID PANEL   Result Value Ref Range    Cholesterol, total 181 100 - 199 mg/dL    Triglyceride 557 (HH) 0 - 149 mg/dL    HDL Cholesterol 29 (L) >39 mg/dL    VLDL, calculated Comment 5 - 40 mg/dL    LDL, calculated Comment 0 - 99 mg/dL   METABOLIC PANEL, COMPREHENSIVE   Result Value Ref Range    Glucose 543 (>) 65 - 99 mg/dL    BUN 15 8 - 27 mg/dL    Creatinine 1.13 0.76 - 1.27 mg/dL    GFR est non-AA 67 >59 mL/min/1.73    GFR est AA 78 >59 mL/min/1.73    BUN/Creatinine ratio 13 10 - 24    Sodium 132 (L) 134 - 144 mmol/L    Potassium 4.6 3.5 - 5.2 mmol/L    Chloride 94 (L) 96 - 106 mmol/L    CO2 19 (L) 20 - 29 mmol/L    Calcium 9.7 8.6 - 10.2 mg/dL    Protein, total 6.8 6.0 - 8.5 g/dL    Albumin 4.1 3.6 - 4.8 g/dL    GLOBULIN, TOTAL 2.7 1.5 - 4.5 g/dL    A-G Ratio 1.5 1.2 - 2.2    Bilirubin, total 0.6 0.0 - 1.2 mg/dL    Alk.  phosphatase 152 (H) 39 - 117 IU/L    AST (SGOT) 36 0 - 40 IU/L    ALT (SGPT) 36 0 - 44 IU/L   VITAMIN D, 25 HYDROXY   Result Value Ref Range    VITAMIN D, 25-HYDROXY 28.6 (L) 30.0 - 100.0 ng/mL   HEMOGLOBIN A1C WITH EAG   Result Value Ref Range    Hemoglobin A1c 11.0 (H) 4.8 - 5.6 %    Estimated average glucose 269 mg/dL   URINALYSIS W/ RFLX MICROSCOPIC   Result Value Ref Range    Specific Gravity      >=1.030 (A) 1.005 - 1.030    pH (UA) 5.0 5.0 - 7.5    Color Yellow Yellow    Appearance Clear Clear    Leukocyte Esterase Negative Negative    Protein Negative Negative/Trace    Glucose 3+ (A) Negative    Ketone Negative Negative    Blood Negative Negative    Bilirubin Negative Negative    Urobilinogen 0.2 0.2 - 1.0 mg/dL    Nitrites Negative Negative    Microscopic Examination Comment      Assessment/Plan:  Diagnoses and all orders for this visit:    Hypertension, well controlled  -     Cancel: METABOLIC PANEL, COMPREHENSIVE; Future  -     METABOLIC PANEL, COMPREHENSIVE    Chronic primary gouty arthritis  -     allopurinoL (ZYLOPRIM) 300 mg tablet; TAKE 1 TABLET BY MOUTH DAILY TO PREVENT GOUT ATTACKS, Normal, Disp-90 Tab, R-2    Mixed hypercholesterolemia and hypertriglyceridemia  -     atorvastatin (LIPITOR) 20 mg tablet; TAKE 1 TABLET BY MOUTH  DAILY., Normal, Disp-90 Tab, R-2  -     LIPID PANEL    Hypovitaminosis D  -     cholecalciferol (VITAMIN D3) (5000 Units/125 mcg) tab tablet; TAKE 1 TABLET BY MOUTH EVERY DAY, Normal, Disp-90 Tab, R-1  -     VITAMIN D, 25 HYDROXY    Well controlled type 2 diabetes mellitus with peripheral neuropathy (HCC)  -     metFORMIN (GLUCOPHAGE) 1,000 mg tablet; Take 1 Tab by mouth two (2) times daily (with meals). , Normal, Disp-180 Tab, R-0  -     Cancel: METABOLIC PANEL, COMPREHENSIVE; Future  -     MICROALBUMIN, UR, RAND W/ MICROALB/CREAT RATIO  -     HEMOGLOBIN A1C WITH EAG  -     METABOLIC PANEL, COMPREHENSIVE    Encounter for diabetic foot exam ([address]  -     REFERRAL TO PODIATRY      Patient Instructions        A Healthy Lifestyle: Care Instructions  Your Care Instructions    A healthy lifestyle can help you feel good, stay at a healthy weight, and have plenty of energy for both work and play. A healthy lifestyle is something you can share with your whole family.   A healthy lifestyle also can lower your risk for serious health problems, such as high blood pressure, heart disease, and diabetes. You can follow a few steps listed below to improve your health and the health of your family. Follow-up care is a key part of your treatment and safety. Be sure to make and go to all appointments, and call your doctor if you are having problems. It's also a good idea to know your test results and keep a list of the medicines you take. How can you care for yourself at home? · Do not eat too much sugar, fat, or fast foods. You can still have dessert and treats now and then. The goal is moderation. · Start small to improve your eating habits. Pay attention to portion sizes, drink less juice and soda pop, and eat more fruits and vegetables. ? Eat a healthy amount of food. A 3-ounce serving of meat, for example, is about the size of a deck of cards. Fill the rest of your plate with vegetables and whole grains. ? Limit the amount of soda and sports drinks you have every day. Drink more water when you are thirsty. ? Eat at least 5 servings of fruits and vegetables every day. It may seem like a lot, but it is not hard to reach this goal. A serving or helping is 1 piece of fruit, 1 cup of vegetables, or 2 cups of leafy, raw vegetables. Have an apple or some carrot sticks as an afternoon snack instead of a candy bar. Try to have fruits and/or vegetables at every meal.  · Make exercise part of your daily routine. You may want to start with simple activities, such as walking, bicycling, or slow swimming. Try to be active 30 to 60 minutes every day. You do not need to do all 30 to 60 minutes all at once. For example, you can exercise 3 times a day for 10 or 20 minutes. Moderate exercise is safe for most people, but it is always a good idea to talk to your doctor before starting an exercise program.  · Keep moving. [place] [employer]ish[employer] the lawn, work in the garden, or [employer]. Take the stairs instead of the elevator at work. · If you smoke, quit. People who smoke have an increased risk for heart attack, stroke, cancer, and other lung illnesses. Quitting is hard, but there are ways to boost your chance of quitting tobacco for good. ? Use nicotine gum, patches, or lozenges. ? Ask your doctor about stop-smoking programs and medicines. ? Keep trying. In addition to reducing your risk of diseases in the future, you will notice some benefits soon after you stop using tobacco. If you have shortness of breath or asthma symptoms, they will likely get better within a few weeks after you quit. · Limit how much alcohol you drink. Moderate amounts of alcohol (up to 2 drinks a day for men, 1 drink a day for women) are okay. But drinking too much can lead to liver problems, high blood pressure, and other health problems. Family health  If you have a family, there are many things you can do together to improve your health. · Eat meals together as a family as often as possible. · Eat healthy foods. This includes fruits, vegetables, lean meats and dairy, and whole grains. · Include your family in your fitness plan. Most people think of activities such as jogging or tennis as the way to fitness, but there are many ways you and your family can be more active. Anything that makes you breathe hard and gets your heart pumping is exercise. Here are some tips:  ? Walk to do errands or to take your child to school or the bus.  ? Go for a family bike ride after dinner instead of watching TV. Where can you learn more? Go to http://lyla-mavis.info/. Enter U709 in the search box to learn more about \"A Healthy Lifestyle: Care Instructions. \"  Current as of: May 28, 2019  Content Version: 12.2  © 0601-1963 [employer]. Care instructions adapted under license by [employer] (which disclaims liability or warranty for this information).  If you have questions about a medical condition or this instruction, always ask your healthcare professional. [address] any warranty or liability for your use of this information. Follow-up and Dispositions    · Return in about 3 weeks (around 2/17/2020), or if symptoms worsen or fail to improve, for  follow up results.

## 2020-01-29 LAB
25(OH)D3+25(OH)D2 SERPL-MCNC: 20.8 NG/ML (ref 30–100)
ALBUMIN SERPL-MCNC: 4.5 G/DL (ref 3.8–4.8)
ALBUMIN/CREAT UR: 4 MG/G CREAT (ref 0–29)
ALBUMIN/GLOB SERPL: 1.9 {RATIO} (ref 1.2–2.2)
ALP SERPL-CCNC: 97 IU/L (ref 39–117)
ALT SERPL-CCNC: 31 IU/L (ref 0–44)
AST SERPL-CCNC: 23 IU/L (ref 0–40)
BILIRUB SERPL-MCNC: 0.7 MG/DL (ref 0–1.2)
BUN SERPL-MCNC: 13 MG/DL (ref 8–27)
BUN/CREAT SERPL: 11 (ref 10–24)
CALCIUM SERPL-MCNC: 9.6 MG/DL (ref 8.6–10.2)
CHLORIDE SERPL-SCNC: 97 MMOL/L (ref 96–106)
CO2 SERPL-SCNC: 21 MMOL/L (ref 20–29)
CREAT SERPL-MCNC: 1.2 MG/DL (ref 0.76–1.27)
CREAT UR-MCNC: 357.9 MG/DL
EST. AVERAGE GLUCOSE BLD GHB EST-MCNC: 194 MG/DL
GLOBULIN SER CALC-MCNC: 2.4 G/DL (ref 1.5–4.5)
GLUCOSE SERPL-MCNC: 129 MG/DL (ref 65–99)
HBA1C MFR BLD: 8.4 % (ref 4.8–5.6)
Lab: NORMAL
MICROALBUMIN UR-MCNC: 15.9 UG/ML
POTASSIUM SERPL-SCNC: 4.2 MMOL/L (ref 3.5–5.2)
PROT SERPL-MCNC: 6.9 G/DL (ref 6–8.5)
SODIUM SERPL-SCNC: 135 MMOL/L (ref 134–144)

## 2020-02-10 ENCOUNTER — OFFICE VISIT (OUTPATIENT)
Dept: FAMILY MEDICINE CLINIC | Age: 70
End: 2020-02-10

## 2020-02-10 VITALS
OXYGEN SATURATION: 97 % | BODY MASS INDEX: 31.7 KG/M2 | WEIGHT: 214 LBS | SYSTOLIC BLOOD PRESSURE: 125 MMHG | DIASTOLIC BLOOD PRESSURE: 77 MMHG | HEIGHT: 69 IN | RESPIRATION RATE: 20 BRPM | TEMPERATURE: 97.4 F | HEART RATE: 89 BPM

## 2020-02-10 DIAGNOSIS — E78.2 MIXED HYPERCHOLESTEROLEMIA AND HYPERTRIGLYCERIDEMIA: ICD-10-CM

## 2020-02-10 DIAGNOSIS — I10 HYPERTENSION, WELL CONTROLLED: ICD-10-CM

## 2020-02-10 DIAGNOSIS — E55.9 HYPOVITAMINOSIS D: ICD-10-CM

## 2020-02-10 DIAGNOSIS — E11.21 CONTROLLED TYPE 2 DIABETES MELLITUS WITH DIABETIC NEPHROPATHY, WITHOUT LONG-TERM CURRENT USE OF INSULIN (HCC): Primary | ICD-10-CM

## 2020-02-10 RX ORDER — CHOLECALCIFEROL TAB 125 MCG (5000 UNIT) 125 MCG
TAB ORAL
Qty: 90 TAB | Refills: 1 | Status: SHIPPED | OUTPATIENT
Start: 2020-02-10 | End: 2022-02-24 | Stop reason: SDUPTHER

## 2020-02-10 RX ORDER — METFORMIN HYDROCHLORIDE 1000 MG/1
1000 TABLET ORAL 2 TIMES DAILY WITH MEALS
Qty: 180 TAB | Refills: 0 | Status: SHIPPED | OUTPATIENT
Start: 2020-02-10 | End: 2020-05-11 | Stop reason: SDUPTHER

## 2020-02-10 RX ORDER — GLIPIZIDE 10 MG/1
10 TABLET ORAL 2 TIMES DAILY
Qty: 180 TAB | Refills: 1 | Status: SHIPPED | OUTPATIENT
Start: 2020-02-10 | End: 2020-08-08

## 2020-02-10 NOTE — PATIENT INSTRUCTIONS
Glipizide (Glucotrol, Glucotrol XL) - (By mouth)   Why this medicine is used:   Treats type 2 diabetes. Contact a nurse or doctor right away if you have:  · Sweating, trembling, shakiness  · Increased hunger     Common side effects:  · Mild nausea, diarrhea, stomach upset  · Dizziness, headache  · Tremor  © 2017 [facility] Information is for End User's use only and may not be sold, redistributed or otherwise used for commercial purposes.

## 2020-02-10 NOTE — PROGRESS NOTES
Chief Complaint   Patient presents with    Results     LABS     HPI:  Deanne Guzman is a 81 y.o. male presents to follow up results. A1C shows an improved Diabetes compared to last..  Vit D is trending down, advised to resume vit D supplement  Results and management have discussed. Diet and exercise are encouraged. Review of Systems  As per hpi    Past Medical History:   Diagnosis Date    Diabetes ([facility] [address]     Gout 3/24/2014    HTN (hypertension) 3/24/2014    Hypercholesterolemia     Obesity, Class II, BMI 35-39.9, with comorbidity 3/24/2014    Partial unilateral paresis ([address] 3/24/2014     Past Surgical History:   Procedure Laterality Date    HX ORTHOPAEDIC      repair left eye socket     Social History     Socioeconomic History    Marital status:      Spouse name: Not on file    Number of children: Not on file    Years of education: Not on file    Highest education level: Not on file   Tobacco Use    Smoking status: Never Smoker    Smokeless tobacco: Never Used   Substance and Sexual Activity    Alcohol use: No    Drug use: No     History reviewed. No pertinent family history. Current Outpatient Medications   Medication Sig Dispense Refill    allopurinoL (ZYLOPRIM) 300 mg tablet TAKE 1 TABLET BY MOUTH DAILY TO PREVENT GOUT ATTACKS 90 Tab 2    atorvastatin (LIPITOR) 20 mg tablet TAKE 1 TABLET BY MOUTH  DAILY. 90 Tab 2    cholecalciferol (VITAMIN D3) (5000 Units/125 mcg) tab tablet TAKE 1 TABLET BY MOUTH EVERY DAY 90 Tab 1    metFORMIN (GLUCOPHAGE) 1,000 mg tablet Take 1 Tab by mouth two (2) times daily (with meals).  180 Tab 0    diclofenac EC (VOLTAREN) 75 mg EC tablet 1 tab by mouth 2 times a day with meals  Indications: joint damage causing pain and loss of function 60 Tab 3    glucose blood VI test strips (ONETOUCH ULTRA TEST) strip Check blood sugar once a day 50 Strip 3    Insulin Needles, Disposable, 31 gauge x 5/16\" ndle One touch meter 1 Package 11    aspirin (ASPIRIN) 325 mg tablet Take 1 Tab by mouth daily. 90 Tab 1     No Known Allergies    Objective:  Visit Vitals  /77   Pulse 89   Temp 97.4 °F (36.3 °C) (Oral)   Resp 20   Ht 5' 8.5\" (1.74 m)   Wt 214 lb (97.1 kg)   SpO2 97%   BMI 32.07 kg/m²     Physical Exam:   General appearance - alert, well appearing in no distress  Mental status - alert, oriented to person, place, and time  Neck - supple, no significant adenopathy   Chest - clear to auscultation, no wheezes, rales or rhonchi  Heart - normal rate, regular rhythm, normal blood pressure   Abdomen - soft, nontender, nondistended, no organomegaly  Ext-peripheral pulses normal, no pedal edema  Neuro - no focal findings  Back-full range of motion, no tenderness, palpable spasm or pain on motion     Results for orders placed or performed in visit on 01/27/20   VITAMIN D, 25 HYDROXY   Result Value Ref Range    VITAMIN D, 25-HYDROXY 20.8 (L) 30.0 - 100.0 ng/mL   MICROALBUMIN, UR, RAND W/ MICROALB/CREAT RATIO   Result Value Ref Range    Creatinine, urine 357.9 Not Estab. mg/dL    Microalbumin, urine 15.9 Not Estab. ug/mL    Microalb/Creat ratio (ug/mg creat.) 4 0 - 29 mg/g creat   HEMOGLOBIN A1C WITH EAG   Result Value Ref Range    Hemoglobin A1c 8.4 (H) 4.8 - 5.6 %    Estimated average glucose 292 mg/dL   METABOLIC PANEL, COMPREHENSIVE   Result Value Ref Range    Glucose 129 (H) 65 - 99 mg/dL    BUN 13 8 - 27 mg/dL    Creatinine 1.20 0.76 - 1.27 mg/dL    GFR est non-AA 62 >59 mL/min/1.73    GFR est AA 72 >59 mL/min/1.73    BUN/Creatinine ratio 11 10 - 24    Sodium 135 134 - 144 mmol/L    Potassium 4.2 3.5 - 5.2 mmol/L    Chloride 97 96 - 106 mmol/L    CO2 21 20 - 29 mmol/L    Calcium 9.6 8.6 - 10.2 mg/dL    Protein, total 6.9 6.0 - 8.5 g/dL    Albumin 4.5 3.8 - 4.8 g/dL    GLOBULIN, TOTAL 2.4 1.5 - 4.5 g/dL    A-G Ratio 1.9 1.2 - 2.2    Bilirubin, total 0.7 0.0 - 1.2 mg/dL    Alk.  phosphatase 97 39 - 117 IU/L    AST (SGOT) 23 0 - 40 IU/L    ALT (SGPT) 31 0 - 44 IU/L   DIABETES PATIENT EDUCATION   Result Value Ref Range    PDF Image Not applicable      Assessment/Plan:  Diagnoses and all orders for this visit:    Controlled type 2 diabetes mellitus with diabetic nephropathy, without long-term current use of insulin (HCC)  -     glipiZIDE (GLUCOTROL) 10 mg tablet; Take 1 Tab by mouth two (2) times a day., Normal, Disp-180 Tab, R-1  -     metFORMIN (GLUCOPHAGE) 1,000 mg tablet; Take 1 Tab by mouth two (2) times daily (with meals). , Normal, Disp-180 Tab, R-0    Hypovitaminosis D  -     cholecalciferol (VITAMIN D3) (5000 Units/125 mcg) tab tablet; TAKE 1 TABLET BY MOUTH EVERY DAY, Normal, Disp-90 Tab, R-1    Hypertension, well controlled    Mixed hypercholesterolemia and hypertriglyceridemia  -     LIPID PANEL      Patient Instructions      Glipizide (Glucotrol, Glucotrol XL) - (By mouth)   Why this medicine is used:   Treats type 2 diabetes. Contact a nurse or doctor right away if you have:  · Sweating, trembling, shakiness  · Increased hunger     Common side effects:  · Mild nausea, diarrhea, stomach upset  · Dizziness, headache  · Tremor  © 2017 [facility] Information is for End User's use only and may not be sold, redistributed or otherwise used for commercial purposes. Follow-up and Dispositions    · Return in about 3 months (around 5/10/2020), or if symptoms worsen or fail to improve, for routine follow up.

## 2020-02-11 LAB
CHOLEST SERPL-MCNC: 146 MG/DL (ref 100–199)
HDLC SERPL-MCNC: 33 MG/DL
LDLC SERPL CALC-MCNC: 57 MG/DL (ref 0–99)
TRIGL SERPL-MCNC: 281 MG/DL (ref 0–149)
VLDLC SERPL CALC-MCNC: 56 MG/DL (ref 5–40)

## 2020-05-11 ENCOUNTER — VIRTUAL VISIT (OUTPATIENT)
Dept: FAMILY MEDICINE CLINIC | Age: 70
End: 2020-05-11

## 2020-05-11 VITALS — BODY MASS INDEX: 32.07 KG/M2 | HEIGHT: 69 IN

## 2020-05-11 DIAGNOSIS — Z13.29 SCREENING FOR THYROID DISORDER: ICD-10-CM

## 2020-05-11 DIAGNOSIS — E11.21 CONTROLLED TYPE 2 DIABETES MELLITUS WITH DIABETIC NEPHROPATHY, WITHOUT LONG-TERM CURRENT USE OF INSULIN (HCC): Primary | ICD-10-CM

## 2020-05-11 DIAGNOSIS — I10 HYPERTENSION, WELL CONTROLLED: ICD-10-CM

## 2020-05-11 DIAGNOSIS — R35.0 FREQUENCY OF URINATION: ICD-10-CM

## 2020-05-11 DIAGNOSIS — E55.9 HYPOVITAMINOSIS D: ICD-10-CM

## 2020-05-11 DIAGNOSIS — E78.2 MIXED HYPERCHOLESTEROLEMIA AND HYPERTRIGLYCERIDEMIA: ICD-10-CM

## 2020-05-11 RX ORDER — METFORMIN HYDROCHLORIDE 1000 MG/1
1000 TABLET ORAL 2 TIMES DAILY WITH MEALS
Qty: 180 TAB | Refills: 1 | Status: SHIPPED | OUTPATIENT
Start: 2020-05-11 | End: 2021-10-13 | Stop reason: SDUPTHER

## 2020-05-11 NOTE — PROGRESS NOTES
Chief Complaint   Patient presents with    Follow-up     3month     HPI:  Olena Hollis is a 81 y.o. male evaluated via audio only technology on 5/11/2020. Consent: He and/or his health care decision maker is aware that he may receive a bill for this audio only encounter, depending on his insurance coverage, and has provided verbal consent to proceed: Yes    I communicated with the patient and/or health care decision maker about the nature and details of the following:  Assessment & Plan:   Diagnoses and all orders for this visit:    1. Controlled type 2 diabetes mellitus with diabetic nephropathy, without long-term current use of insulin (HCC)  -     metFORMIN (GLUCOPHAGE) 1,000 mg tablet; Take 1 Tab by mouth two (2) times daily (with meals). -     METABOLIC PANEL, COMPREHENSIVE  -     HEMOGLOBIN A1C WITH EAG    2. Hypertension, well controlled  -     METABOLIC PANEL, COMPREHENSIVE    3. Mixed hypercholesterolemia and hypertriglyceridemia  -     LIPID PANEL    4. Hypovitaminosis D  -     VITAMIN D, 25 HYDROXY    5. Screening for thyroid disorder  -     TSH 3RD GENERATION    6. Frequency of urination  -     URINALYSIS W/ RFLX MICROSCOPIC      Subjective:   Olena Hollis is a 81 y.o.  male with h/o hypertension, hypertension, diabetes type 2(A1C=8.4%), obesity who was seen for 3 month follow-up. Patient is doing well. He has no complaints. Patient is asking for medication refills    Prior to Admission medications    Medication Sig Start Date End Date Taking? Authorizing Provider   cholecalciferol (VITAMIN D3) (5000 Units/125 mcg) tab tablet TAKE 1 TABLET BY MOUTH EVERY DAY 2/10/20  Yes Khadar Ace MD   glipiZIDE (GLUCOTROL) 10 mg tablet Take 1 Tab by mouth two (2) times a day. 2/10/20  Yes Henri Paulino MD   metFORMIN (GLUCOPHAGE) 1,000 mg tablet Take 1 Tab by mouth two (2) times daily (with meals).  2/10/20  Yes Henri Paulino MD   allopurinoL (ZYLOPRIM) 300 mg tablet TAKE 1 TABLET BY MOUTH DAILY TO PREVENT GOUT ATTACKS 1/27/20  Yes Romana Wiley MD   atorvastatin (LIPITOR) 20 mg tablet TAKE 1 TABLET BY MOUTH  DAILY. 1/27/20  Yes Romana Wiley MD   diclofenac EC (VOLTAREN) 75 mg EC tablet 1 tab by mouth 2 times a day with meals  Indications: joint damage causing pain and loss of function 9/17/19  Yes Romana Wiley MD   glucose blood VI test strips (ONETOUCH ULTRA TEST) strip Check blood sugar once a day 8/14/18  Yes hKadar Ace MD   Insulin Needles, Disposable, 31 gauge x 5/16\" ndle One touch meter 8/14/18  Yes Romana Wiley MD   aspirin (ASPIRIN) 325 mg tablet Take 1 Tab by mouth daily. 3/28/17  Yes Romana Wiley MD     No Known Allergies    Patient Active Problem List   Diagnosis Code    Partial unilateral paresis ([facility] [address] G83.9    Obesity, Class II, BMI 35-39.9, with comorbidity [id number]    Mixed hypercholesterolemia and hypertriglyceridemia E78.2    Hypertension, well controlled I5    Well controlled type 2 diabetes mellitus with peripheral neuropathy ([facility] [address] E11.42    Chronic primary gouty arthritis M1A. 00X0    Hypovitaminosis D E55.9    Type 2 diabetes mellitus with diabetic neuropathy (HCC) E11.40     Current Outpatient Medications   Medication Sig Dispense Refill    metFORMIN (GLUCOPHAGE) 1,000 mg tablet Take 1 Tab by mouth two (2) times daily (with meals). 180 Tab 1    cholecalciferol (VITAMIN D3) (5000 Units/125 mcg) tab tablet TAKE 1 TABLET BY MOUTH EVERY DAY 90 Tab 1    glipiZIDE (GLUCOTROL) 10 mg tablet Take 1 Tab by mouth two (2) times a day. 180 Tab 1    allopurinoL (ZYLOPRIM) 300 mg tablet TAKE 1 TABLET BY MOUTH DAILY TO PREVENT GOUT ATTACKS 90 Tab 2    atorvastatin (LIPITOR) 20 mg tablet TAKE 1 TABLET BY MOUTH  DAILY.  90 Tab 2    diclofenac EC (VOLTAREN) 75 mg EC tablet 1 tab by mouth 2 times a day with meals  Indications: joint damage causing pain and loss of function 60 Tab 3    glucose blood VI test strips (ONETOUCH ULTRA TEST) strip Check blood sugar once a day 50 Strip 3    Insulin Needles, Disposable, 31 gauge x 5/16\" ndle One touch meter 1 Package 11    aspirin (ASPIRIN) 325 mg tablet Take 1 Tab by mouth daily. 90 Tab 1     No Known Allergies  Past Medical History:   Diagnosis Date    Diabetes ([address]     Gout 3/24/2014    HTN (hypertension) 3/24/2014    Hypercholesterolemia     Obesity, Class II, BMI 35-39.9, with comorbidity 3/24/2014    Partial unilateral paresis ([address] 3/24/2014     Past Surgical History:   Procedure Laterality Date    HX ORTHOPAEDIC      repair left eye socket     No family history on file. Social History     Tobacco Use    Smoking status: Never Smoker    Smokeless tobacco: Never Used   Substance Use Topics    Alcohol use: No     ROS  As per hpi    I affirm this is a Patient-Initiated Episode with a Patient who has not had a related appointment within my department in the past 7 days or scheduled within the next 24 hours.     Total Time: minutes: 5-10 minutes    Note: not billable if this call serves to triage the patient into an appointment for the relevant concern    Joan Carballo MD

## 2020-05-13 LAB
25(OH)D3+25(OH)D2 SERPL-MCNC: 32.9 NG/ML (ref 30–100)
ALBUMIN SERPL-MCNC: 4.3 G/DL (ref 3.8–4.8)
ALBUMIN/GLOB SERPL: 1.7 {RATIO} (ref 1.2–2.2)
ALP SERPL-CCNC: 102 IU/L (ref 39–117)
ALT SERPL-CCNC: 15 IU/L (ref 0–44)
APPEARANCE UR: CLEAR
AST SERPL-CCNC: 16 IU/L (ref 0–40)
BILIRUB SERPL-MCNC: 0.6 MG/DL (ref 0–1.2)
BILIRUB UR QL STRIP: NEGATIVE
BUN SERPL-MCNC: 19 MG/DL (ref 8–27)
BUN/CREAT SERPL: 16 (ref 10–24)
CALCIUM SERPL-MCNC: 9.7 MG/DL (ref 8.6–10.2)
CHLORIDE SERPL-SCNC: 102 MMOL/L (ref 96–106)
CHOLEST SERPL-MCNC: 154 MG/DL (ref 100–199)
CO2 SERPL-SCNC: 23 MMOL/L (ref 20–29)
COLOR UR: YELLOW
CREAT SERPL-MCNC: 1.2 MG/DL (ref 0.76–1.27)
EST. AVERAGE GLUCOSE BLD GHB EST-MCNC: 146 MG/DL
GLOBULIN SER CALC-MCNC: 2.6 G/DL (ref 1.5–4.5)
GLUCOSE SERPL-MCNC: 122 MG/DL (ref 65–99)
GLUCOSE UR QL: NEGATIVE
HBA1C MFR BLD: 6.7 % (ref 4.8–5.6)
HDLC SERPL-MCNC: 32 MG/DL
HGB UR QL STRIP: NEGATIVE
KETONES UR QL STRIP: NEGATIVE
LDLC SERPL CALC-MCNC: 75 MG/DL (ref 0–99)
LEUKOCYTE ESTERASE UR QL STRIP: NEGATIVE
MICRO URNS: NORMAL
NITRITE UR QL STRIP: NEGATIVE
PH UR STRIP: 5 [PH] (ref 5–7.5)
POTASSIUM SERPL-SCNC: 4.2 MMOL/L (ref 3.5–5.2)
PROT SERPL-MCNC: 6.9 G/DL (ref 6–8.5)
PROT UR QL STRIP: NEGATIVE
SODIUM SERPL-SCNC: 138 MMOL/L (ref 134–144)
SP GR UR: 1.03 (ref 1–1.03)
TRIGL SERPL-MCNC: 236 MG/DL (ref 0–149)
TSH SERPL DL<=0.005 MIU/L-ACNC: 4.72 UIU/ML (ref 0.45–4.5)
UROBILINOGEN UR STRIP-MCNC: 0.2 MG/DL (ref 0.2–1)
VLDLC SERPL CALC-MCNC: 47 MG/DL (ref 5–40)

## 2020-08-07 DIAGNOSIS — E11.21 CONTROLLED TYPE 2 DIABETES MELLITUS WITH DIABETIC NEPHROPATHY, WITHOUT LONG-TERM CURRENT USE OF INSULIN (HCC): ICD-10-CM

## 2020-08-08 RX ORDER — GLIPIZIDE 10 MG/1
TABLET ORAL
Qty: 180 TAB | Refills: 1 | Status: SHIPPED | OUTPATIENT
Start: 2020-08-08 | End: 2021-02-16

## 2020-10-22 DIAGNOSIS — M1A.00X0 CHRONIC PRIMARY GOUTY ARTHRITIS: ICD-10-CM

## 2020-10-22 RX ORDER — ALLOPURINOL 300 MG/1
TABLET ORAL
Qty: 30 TAB | Refills: 0 | Status: SHIPPED | OUTPATIENT
Start: 2020-10-22

## 2021-02-14 DIAGNOSIS — E11.21 CONTROLLED TYPE 2 DIABETES MELLITUS WITH DIABETIC NEPHROPATHY, WITHOUT LONG-TERM CURRENT USE OF INSULIN (HCC): ICD-10-CM

## 2021-02-16 RX ORDER — GLIPIZIDE 10 MG/1
TABLET ORAL
Qty: 180 TAB | Refills: 1 | Status: SHIPPED | OUTPATIENT
Start: 2021-02-16 | End: 2021-10-08

## 2021-10-07 DIAGNOSIS — E11.21 CONTROLLED TYPE 2 DIABETES MELLITUS WITH DIABETIC NEPHROPATHY, WITHOUT LONG-TERM CURRENT USE OF INSULIN (HCC): ICD-10-CM

## 2021-10-08 RX ORDER — GLIPIZIDE 10 MG/1
TABLET ORAL
Qty: 180 TABLET | Refills: 1 | Status: SHIPPED | OUTPATIENT
Start: 2021-10-08 | End: 2021-10-13 | Stop reason: SDUPTHER

## 2021-10-14 ENCOUNTER — OFFICE VISIT (OUTPATIENT)
Dept: SLEEP MEDICINE | Age: 71
End: 2021-10-14
Payer: COMMERCIAL

## 2021-10-14 VITALS
OXYGEN SATURATION: 95 % | SYSTOLIC BLOOD PRESSURE: 122 MMHG | HEIGHT: 69 IN | DIASTOLIC BLOOD PRESSURE: 85 MMHG | WEIGHT: 213 LBS | BODY MASS INDEX: 31.55 KG/M2 | HEART RATE: 96 BPM

## 2021-10-14 DIAGNOSIS — G47.33 OSA (OBSTRUCTIVE SLEEP APNEA): Primary | ICD-10-CM

## 2021-10-14 PROCEDURE — 99204 OFFICE O/P NEW MOD 45 MIN: CPT | Performed by: SPECIALIST

## 2021-10-14 NOTE — PATIENT INSTRUCTIONS
Learning About Sleep Apnea  What is it? Sleep apnea means that breathing stops for short periods during sleep. When you stop breathing or have reduced airflow into your lungs during sleep, you don't sleep well and you can be very tired during the day. The oxygen levels in your blood may go down, and carbon dioxide levels go up. It may lead to other problems, such as high blood pressure and heart disease. Sleep apnea can range from mild to severe, based on how often breathing stops during sleep. For adults, breathing may stop as few as 5 times an hour (mild apnea) to 30 or more times an hour (severe apnea). Obstructive sleep apnea is the most common type. This most often occurs because your airways are blocked or partly blocked. Central sleep apnea is less common. It happens when the brain has trouble controlling breathing. Some people have both types. That's called complex sleep apnea. What are the symptoms? There are symptoms of sleep apnea that you may notice and symptoms that others may notice when you're asleep. Symptoms you may notice include:  · Feeling extremely sleepy during the day. · Feeling unrefreshed or tired after a night's sleep. · Problems with memory and concentration, or mood changes. · Morning headaches. · Getting up often during the night to urinate. · A dry mouth or sore throat in the morning. If you have a bed partner, they may notice that you:  · Have episodes of not breathing. · Snore loudly. Almost all people who have sleep apnea snore. But not all people who snore have sleep apnea. · Toss and turn during sleep. · Have nighttime choking or gasping spells. How is it diagnosed? Your doctor will probably do a physical exam and ask about your past health. The doctor may also ask you or your bed partner about your snoring and sleep behavior and how tired you feel during the day. Your doctor may suggest a sleep study.  Sleep studies are a series of tests that look at what happens to the body during sleep. They check for how often you stop breathing or have too little air flowing into your lungs during sleep. They also find out how much oxygen you have in your blood during sleep. A sleep study may take place in your home. Or it might take place at a sleep center, where you will spend the night. If your sleep apnea doesn't improve with treatment, you may have more tests to find out what's causing it. How is it treated? You may be able to help treat sleep apnea by making some lifestyle changes. You could try to lose weight, sleep on your side, and avoid alcohol and medicines like sedatives before bed. Sleep apnea is often treated with machines that deliver air through a mask to help keep your airways open. These include:  · Continuous positive airway pressure (CPAP). This increases air pressure in your throat. It keeps your airway open when you breathe in. It's the most common device. · Bilevel positive airway pressure (BPAP). You may also hear this called BiPAP. This uses different air pressures when you breathe in and out. · Adaptive servo ventilation (ASV). It senses pauses in breathing and adjusts air pressure. It's mostly used for central sleep apnea. You can also try oral breathing devices or nasal devices. If your tonsils or other tissues are blocking your airway, your doctor may suggest surgery to open the airway. How can you care for yourself at home? · Lose weight, if needed. · Sleep on your side. It may help mild apnea. · Avoid alcohol and medicines such as sleeping pills, opioids, or sedatives before bed. · Don't smoke. If you need help quitting, talk to your doctor. · Prop up the head of your bed. · Treat breathing problems, such as a stuffy nose, that are caused by a cold or allergies. · Try a continuous positive airway pressure (CPAP) breathing machine if your doctor recommends it.   · If CPAP doesn't work for you, ask your doctor if you can try other masks, settings, or breathing machines. · Try oral breathing devices or other nasal devices. · Talk to your doctor if your nose feels dry or bleeds, or if it gets runny or stuffy when you use a breathing machine. · Tell your doctor if you're sleepy during the day and it affects your daily life. Don't drive or operate machinery when you're drowsy. Where can you learn more? Go to http://www.gray.com/  Enter S121 in the search box to learn more about \"Learning About Sleep Apnea. \"  Current as of: July 6, 2021               Content Version: 13.0  © 5734-1679 Healthwise, [employer]. Care instructions adapted under license by [employer] (which disclaims liability or warranty for this information). If you have questions about a medical condition or this instruction, always ask your healthcare professional. [address] any warranty or liability for your use of this information.

## 2021-10-14 NOTE — PROGRESS NOTES
[address] [facility]. [place], [facility]  Tel.  [phone]  Fax. [address] [facility] [address]  [place], [address] [facility]  Tel.  [phone]  Fax. [phone] [address] [facility] [address]   Tel.  [phone]  Fax. [phone]       Chief Complaint       Chief Complaint   Patient presents with    Sleep Problem     NP; Dr Ulysses Hobson previous patient; need supplies sent to [facility]. is 78 y.o. male seen for evaluation of a sleep disorder. He had initial evaluation  At [facility] in [facility]. PSG demonstrated sleep disordered breathing characterized by an overall AHI of titration study was performed. 11.5/h. Minimal SaO2 85%. Events more prominent supine with a supinerelated AHI of 20.7/h. Titration study demonstrated AHI of 1.7/h at 12 cm CPAP with minimal SaO2 93%. When seen in follow-up CMS compliance 77%. AHI 0.2/h. He has not been obtaining supplies on a regular basis. Has been using a mask and headgear potentially provided in 2016. Currently retires P 1111: 30 p.m. and will get a bed at 8: 30 AM.  He denies significant fatigue on awakening or during the day. He does note ongoing snoring. He cannot fall asleep inappropriately during the day. He denies vivid dreaming or nightmares, sleep talking or sleepwalking, bruxism or nocturnal incontinence, abnormal arm or leg movements, hypnagogic hallucinations, sleep paralysis or cataplexy. No Known Allergies    Current Outpatient Medications   Medication Sig Dispense Refill    glipiZIDE (GLUCOTROL) 10 mg tablet TAKE 1 TABLET BY MOUTH TWICE A  Tablet 1    allopurinoL (ZYLOPRIM) 300 mg tablet TAKE 1 TABLET BY MOUTH DAILY TO PREVENT GOUT ATTACKS 30 Tab 0    metFORMIN (GLUCOPHAGE) 1,000 mg tablet Take 1 Tab by mouth two (2) times daily (with meals).  180 Tab 1    cholecalciferol (VITAMIN D3) (5000 Units/125 mcg) tab tablet TAKE 1 TABLET BY MOUTH EVERY DAY 90 Tab 1    atorvastatin (LIPITOR) 20 mg tablet TAKE 1 TABLET BY MOUTH  DAILY. 90 Tab 2    diclofenac EC (VOLTAREN) 75 mg EC tablet 1 tab by mouth 2 times a day with meals  Indications: joint damage causing pain and loss of function 60 Tab 3    glucose blood VI test strips (ONETOUCH ULTRA TEST) strip Check blood sugar once a day 50 Strip 3    Insulin Needles, Disposable, 31 gauge x 5/16\" ndle One touch meter 1 Package 11    aspirin (ASPIRIN) 325 mg tablet Take 1 Tab by mouth daily. 90 Tab 1        He  has a past medical history of Diabetes ([facility] [address] Gout (3/24/2014), HTN (hypertension) (3/24/2014), Hypercholesterolemia, Obesity, Class II, BMI 35-39.9, with comorbidity (3/24/2014), and Partial unilateral paresis ([facility] [address] (3/24/2014). He  has a past surgical history that includes hx orthopaedic. He family history is not on file. He  reports that he has never smoked. He has never used smokeless tobacco. He reports that he does not drink alcohol and does not use drugs. Review of Systems:  Review of Systems   Constitutional: Negative for chills and fever. HENT: Positive for hearing loss and tinnitus. Eyes: Negative for blurred vision and double vision. Respiratory: Negative for cough and shortness of breath. Cardiovascular: Negative for chest pain and palpitations. Gastrointestinal: Negative for abdominal pain and heartburn. Genitourinary: Negative for frequency and urgency. Musculoskeletal: Positive for back pain. Negative for neck pain. Skin: Negative for itching and rash. Neurological: Negative for dizziness and headaches. Psychiatric/Behavioral: Negative for depression. The patient is not nervous/anxious. Objective:     Visit Vitals  /85   Pulse 96   Ht 5' 9\" (1.753 m)   Wt 213 lb (96.6 kg)   SpO2 95%   BMI 31.45 kg/m²     Body mass index is 31.45 kg/m².     General:   Conversant, cooperative   Eyes:  Pupils equal and reactive, no nystagmus   Oropharynx:   Mallampati score II, tongue normal       Neck:   No carotid bruits; Chest/Lungs:  Clear on auscultation    CVS:  Normal rate, regular rhythm, 1+ distal edema   Skin:  Warm to touch; no obvious rashes   Neuro:  Speech fluent, face symmetrical, tongue movement normal   Psych:  Normal affect,  normal countenance        Assessment:       ICD-10-CM ICD-9-CM    1. ORESTES (obstructive sleep apnea)  G47.33 327.23 SLEEP STUDY UNATTENDED, 4 CHANNEL     Sleep disordered breathing, more prominently supine. Unable to document compliance. He will be reevaluated with a home sleep test.  CPAP will be upgraded following that evaluation. Plan:     Orders Placed This Encounter    SLEEP STUDY UNATTENDED, 4 CHANNEL     Order Specific Question:   Reason for Exam     Answer:   reevaluation sleep apnea       * Patient has a history and examination consistent with the diagnosis of sleep apnea. *Home sleep testing was ordered for reevaluation. * He was provided information on sleep apnea including corresponding risk factors and the importance of proper treatment. * Treatment options if indicated were reviewed today. Instructions:  o Do not engage in activities requiring a normal degree of alertness if fatigue is present. o The patient understands that untreated or undertreated sleep apnea could impair judgement and the ability to function normally during the day.  o Call or return if symptoms worsen or persist.          Audie Gaffney MD, [facility]  Electronically signed 10/14/21       This note was created using voice recognition software. Despite editing, there may be syntax errors. This note will not be viewable in [address]

## 2021-10-20 ENCOUNTER — OFFICE VISIT (OUTPATIENT)
Dept: FAMILY MEDICINE CLINIC | Age: 71
End: 2021-10-20
Payer: COMMERCIAL

## 2021-10-20 VITALS
RESPIRATION RATE: 20 BRPM | DIASTOLIC BLOOD PRESSURE: 76 MMHG | TEMPERATURE: 97.1 F | OXYGEN SATURATION: 98 % | HEART RATE: 66 BPM | WEIGHT: 213 LBS | BODY MASS INDEX: 31.55 KG/M2 | HEIGHT: 69 IN | SYSTOLIC BLOOD PRESSURE: 127 MMHG

## 2021-10-20 DIAGNOSIS — Z01.00 DIABETIC EYE EXAM (HCC): ICD-10-CM

## 2021-10-20 DIAGNOSIS — G83.9 PARTIAL UNILATERAL PARESIS (HCC): ICD-10-CM

## 2021-10-20 DIAGNOSIS — Z12.5 ENCOUNTER FOR PROSTATE CANCER SCREENING: ICD-10-CM

## 2021-10-20 DIAGNOSIS — E55.9 HYPOVITAMINOSIS D: ICD-10-CM

## 2021-10-20 DIAGNOSIS — E11.9 ENCOUNTER FOR DIABETIC FOOT EXAM (HCC): ICD-10-CM

## 2021-10-20 DIAGNOSIS — I10 HYPERTENSION, WELL CONTROLLED: ICD-10-CM

## 2021-10-20 DIAGNOSIS — Z00.00 ENCOUNTER FOR MEDICARE ANNUAL WELLNESS EXAM: Primary | ICD-10-CM

## 2021-10-20 DIAGNOSIS — R35.0 FREQUENCY OF URINATION: ICD-10-CM

## 2021-10-20 DIAGNOSIS — Z13.29 SCREENING FOR THYROID DISORDER: ICD-10-CM

## 2021-10-20 DIAGNOSIS — E78.2 MIXED HYPERCHOLESTEROLEMIA AND HYPERTRIGLYCERIDEMIA: ICD-10-CM

## 2021-10-20 DIAGNOSIS — E11.21 CONTROLLED TYPE 2 DIABETES MELLITUS WITH DIABETIC NEPHROPATHY, WITHOUT LONG-TERM CURRENT USE OF INSULIN (HCC): ICD-10-CM

## 2021-10-20 DIAGNOSIS — E11.9 DIABETIC EYE EXAM (HCC): ICD-10-CM

## 2021-10-20 DIAGNOSIS — Z71.89 ADVANCED CARE PLANNING/COUNSELING DISCUSSION: ICD-10-CM

## 2021-10-20 LAB
GLUCOSE POC: 117 MG/DL
HBA1C MFR BLD HPLC: 7 %

## 2021-10-20 PROCEDURE — 3051F HG A1C>EQUAL 7.0%<8.0%: CPT | Performed by: INTERNAL MEDICINE

## 2021-10-20 PROCEDURE — 82962 GLUCOSE BLOOD TEST: CPT | Performed by: INTERNAL MEDICINE

## 2021-10-20 PROCEDURE — 83036 HEMOGLOBIN GLYCOSYLATED A1C: CPT | Performed by: INTERNAL MEDICINE

## 2021-10-20 PROCEDURE — 1158F ADVNC CARE PLAN TLK DOCD: CPT | Performed by: INTERNAL MEDICINE

## 2021-10-20 PROCEDURE — G0439 PPPS, SUBSEQ VISIT: HCPCS | Performed by: INTERNAL MEDICINE

## 2021-10-20 NOTE — PROGRESS NOTES
Chief Complaint   Patient presents with    Medication Refill    Diabetes     HPI:  Thais Jacobs. is a 78 y.o.  male with h/o hypertension, hypertension, diabetes type 2(A1C=8.8%), obesity presents for follow up. Patient has been doing well. Blood pressure is at goal. Patient has no complaints. He is due for medicare wellness. Advance care planning has been discussed and documented in the system. This is a Subsequent Medicare Annual Wellness Exam (AWV) (Performed 12 months after IPPE or effective date of Medicare Part B enrollment)  I have reviewed the patient's medical history in detail and updated the computerized patient record. History     Past Medical History:   Diagnosis Date    Diabetes ([facility] [address]     Gout 3/24/2014    HTN (hypertension) 3/24/2014    Hypercholesterolemia     Obesity, Class II, BMI 35-39.9, with comorbidity 3/24/2014    Partial unilateral paresis ([facility] [address] 3/24/2014      Past Surgical History:   Procedure Laterality Date    HX ORTHOPAEDIC      repair left eye socket     Current Outpatient Medications   Medication Sig Dispense Refill    metFORMIN (GLUCOPHAGE) 1,000 mg tablet Take 1 Tablet by mouth two (2) times daily (with meals). Take 1 Tab by mouth two (2) times daily (with meals). 60 Tablet 3    glipiZIDE (GLUCOTROL) 10 mg tablet Take 1 Tablet by mouth two (2) times a day. 60 Tablet 3    atorvastatin (LIPITOR) 20 mg tablet TAKE 1 TABLET BY MOUTH  DAILY.  30 Tablet 3    allopurinoL (ZYLOPRIM) 300 mg tablet TAKE 1 TABLET BY MOUTH DAILY TO PREVENT GOUT ATTACKS 30 Tab 0    cholecalciferol (VITAMIN D3) (5000 Units/125 mcg) tab tablet TAKE 1 TABLET BY MOUTH EVERY DAY 90 Tab 1    diclofenac EC (VOLTAREN) 75 mg EC tablet 1 tab by mouth 2 times a day with meals  Indications: joint damage causing pain and loss of function 60 Tab 3    glucose blood VI test strips (ONETOUCH ULTRA TEST) strip Check blood sugar once a day 50 Strip 3    Insulin Needles, Disposable, 31 gauge x 5/16\" ndle One touch meter 1 Package 11    aspirin (ASPIRIN) 325 mg tablet Take 1 Tab by mouth daily. 90 Tab 1     No Known Allergies  No family history on file. Social History     Tobacco Use    Smoking status: Never Smoker    Smokeless tobacco: Never Used   Substance Use Topics    Alcohol use: No     Patient Active Problem List   Diagnosis Code    Partial unilateral paresis (HCC) G83.9    Obesity, Class II, BMI 35-39.9, with comorbidity [id number]    Mixed hypercholesterolemia and hypertriglyceridemia E78.2    Hypertension, well controlled I5    Well controlled type 2 diabetes mellitus with peripheral neuropathy ([facility] [address] E11.42    Chronic primary gouty arthritis M1A. 00X0    Hypovitaminosis D E55.9    Type 2 diabetes mellitus with diabetic neuropathy (HCC) E11.40       Depression Risk Factor Screening:     3 most recent PHQ Screens 2/10/2020   Little interest or pleasure in doing things Not at all   Feeling down, depressed, irritable, or hopeless Not at all   Total Score PHQ 2 0     Alcohol Risk Factor Screening: You do not drink alcohol or very rarely. Functional Ability and Level of Safety:   Hearing Loss  Hearing is good. Activities of Daily Living  The home contains: no safety equipment. Patient does total self care    Fall Risk  Fall Risk Assessment, last 12 mths 2/10/2020   Able to walk? No   Fall in past 12 months? -     Abuse Screen  Patient is not abused    Cognitive Screening   Evaluation of Cognitive Function:  Has your family/caregiver stated any concerns about your memory: no  Normal    Patient Care Team   Patient Care Team:  Brenda Carbajal MD as PCP - General (Internal Medicine)  Brenda Carbajal MD as PCP - Chioma Soliz Provider    Assessment/Plan   Education and counseling provided:  Are appropriate based on today's review and evaluation  Diagnoses and all orders for this visit:    Encounter for Medicare annual wellness exam  -     METABOLIC PANEL, COMPREHENSIVE;  Future  - CBC W/O DIFF; Future    Controlled type 2 diabetes mellitus with diabetic nephropathy, without long-term current use of insulin (HCC)  -     AMB POC HEMOGLOBIN A1C  -     AMB POC GLUCOSE BLOOD, BY GLUCOSE MONITORING DEVICE  -     METABOLIC PANEL, COMPREHENSIVE; Future  -     CBC W/O DIFF; Future  -     MICROALBUMIN, UR, RAND W/ MICROALB/CREAT RATIO; Future    Hypertension, well controlled  -     METABOLIC PANEL, COMPREHENSIVE; Future  -     CBC W/O DIFF; Future    Mixed hypercholesterolemia and hypertriglyceridemia  -     LIPID PANEL; Future    Hypovitaminosis D  -     VITAMIN D, 25 HYDROXY; Future    Screening for thyroid disorder  -     TSH 3RD GENERATION; Future    Frequency of urination  -     URINALYSIS W/ RFLX MICROSCOPIC; Future    Encounter for diabetic foot exam ([facility] [address]  -      DIABETES FOOT EXAM    Diabetic eye exam ([facility] [address]  -     REFERRAL TO OPHTHALMOLOGY    Encounter for prostate cancer screening  -     PSA SCREENING (SCREENING); Future    Advanced care planning/counseling discussion  -     MA ADVANCE CARE PLANNING DISCUSS DOCUMENTED IN MEDICAL RECORD    Partial unilateral paresis [facility])      Patient Instructions        Learning About Foods With Healthy Fats  What foods contain healthy fats? The foods you eat contain nutrients, such as vitamins and minerals. Fat is a nutrient. Your body needs the right amount to stay healthy and work as it should. You can use the list below to help you make choices about which foods to eat. Here are some foods that contain healthy fats.   Unsaturated fats and oils  · Canola oil  · Corn oil  · Flaxseed oil  · Olive oil  · Peanut oil  · Safflower oil  · Sunflower oil  Seafood  · Herring  · Lake trout  · Mackerel  · Oysters  · [place]  · Sardines  Nuts and seeds  · Almonds  · Kai seeds  · Flaxseeds (ground)  · Hazelnuts  · Nut butters (such as peanut and almond)  · Pumpkin seeds  · Sunflower seeds  · Walnuts  Fruits  · Avocados  · Olives  Work with your doctor to find out how much of this nutrient you need. Depending on your health, you may need more or less of it in your diet. Where can you learn more? Go to http://www.[employer].com/  Enter F360 in the search box to learn more about \"Learning About Foods With Healthy Fats. \"  Current as of: December 17, 2020               Content Version: 13.0  © 6370-3290 [employer]. Care instructions adapted under license by [employer] (which disclaims liability or warranty for this information). If you have questions about a medical condition or this instruction, always ask your healthcare professional. [place] [address] any warranty or liability for your use of this information. Follow-up and Dispositions    · Return in about 4 months (around 2/20/2022) for routine follow up.

## 2021-10-20 NOTE — ACP (ADVANCE CARE PLANNING)
Advance Care Planning     Advance Care Planning (ACP) Physician/NP/PA Conversation      Date of Conversation: 10/20/2021  Conducted with: Patient with Decision Making Capacity    Healthcare Decision Maker:   No healthcare decision makers have been documented. Click here to complete [address] including selection of the Healthcare Decision Maker Relationship (ie \"Primary\")    Today we documented Decision Maker(s) consistent with ACP documents on file. Care Preferences:    Hospitalization: \"If your health worsens and it becomes clear that your chance of recovery is unlikely, what would be your preference regarding hospitalization? \"  The patient would prefer hospitalization. Ventilation: \"If you were unable to breathe on your own and your chance of recovery was unlikely, what would be your preference about the use of a ventilator (breathing machine) if it was available to you? \"   The patient would desire the use of a ventilator. Resuscitation: \"In the event your heart stopped as a result of an underlying serious health condition, would you want attempts to be made to restart your heart, or would you prefer a natural death? \"   Yes, attempt to resuscitate.     Additional topics discussed: treatment goals, benefit/burden of treatment options, artificial nutrition, ventilation preferences, hospitalization preferences and resuscitation preferences    Conversation Outcomes / Follow-Up Plan:   ACP complete - no further action today  Reviewed DNR/DNI and patient elects Full Code (Attempt Resuscitation)     Length of Voluntary ACP Conversation in minutes:  35 minutes    Natasha Duenas MD

## 2021-10-20 NOTE — PATIENT INSTRUCTIONS
Learning About Foods With Healthy Fats  What foods contain healthy fats? The foods you eat contain nutrients, such as vitamins and minerals. Fat is a nutrient. Your body needs the right amount to stay healthy and work as it should. You can use the list below to help you make choices about which foods to eat. Here are some foods that contain healthy fats. Unsaturated fats and oils  · Canola oil  · Corn oil  · Flaxseed oil  · Olive oil  · Peanut oil  · Safflower oil  · Sunflower oil  Seafood  · Herring  · Lake trout  · Mackerel  · Oysters  · [place]  · Sardines  Nuts and seeds  · Almonds  · Kai seeds  · Flaxseeds (ground)  · Hazelnuts  · Nut butters (such as peanut and almond)  · Pumpkin seeds  · Sunflower seeds  · Walnuts  Fruits  · Avocados  · Olives  Work with your doctor to find out how much of this nutrient you need. Depending on your health, you may need more or less of it in your diet. Where can you learn more? Go to http://www.herbert.com/  Enter F360 in the search box to learn more about \"Learning About Foods With Healthy Fats. \"  Current as of: December 17, 2020               Content Version: 13.0  © 2006-2021 Healthwise, Incorporated. Care instructions adapted under license by [employer] (which disclaims liability or warranty for this information). If you have questions about a medical condition or this instruction, always ask your healthcare professional. [place] [address] any warranty or liability for your use of this information.

## 2021-10-22 ENCOUNTER — HOSPITAL ENCOUNTER (OUTPATIENT)
Dept: SLEEP MEDICINE | Age: 71
Discharge: HOME | End: 2021-10-22
Payer: COMMERCIAL

## 2021-10-22 PROCEDURE — 95806 SLEEP STUDY UNATT&RESP EFFT: CPT | Performed by: SPECIALIST

## 2021-11-17 ENCOUNTER — TELEPHONE (OUTPATIENT)
Dept: SLEEP MEDICINE | Age: 71
End: 2021-11-17

## 2021-11-17 DIAGNOSIS — G47.33 OSA (OBSTRUCTIVE SLEEP APNEA): Primary | ICD-10-CM

## 2021-11-17 NOTE — TELEPHONE ENCOUNTER
HSAT performed for reevaluation of sleep disordered breathing. Overall AHI of 6/h associated with minimal SaO2 85%. When supine AHI 8.2/h, right lateral 8.9/h. Snoring during 4.3% of the study. Recommendation: APAP 5-15 cm    Sleep technologist: Please review HSAT results with the patient. Order has been entered for APAP 5 to 15 cm. Please schedule first compliance appointment.

## 2021-11-30 ENCOUNTER — DOCUMENTATION ONLY (OUTPATIENT)
Dept: SLEEP MEDICINE | Age: 71
End: 2021-11-30

## 2021-11-30 NOTE — PROGRESS NOTES
Patient came into office to discuss PAP info. He confirmed he would like his order sent to [facility] and he will call back to schedule adherence once he receives his PAP device. Faxed PAP order to medical equipment company.

## 2022-01-08 DIAGNOSIS — E78.2 MIXED HYPERCHOLESTEROLEMIA AND HYPERTRIGLYCERIDEMIA: ICD-10-CM

## 2022-01-09 DIAGNOSIS — E11.21 CONTROLLED TYPE 2 DIABETES MELLITUS WITH DIABETIC NEPHROPATHY, WITHOUT LONG-TERM CURRENT USE OF INSULIN (HCC): ICD-10-CM

## 2022-01-10 RX ORDER — ATORVASTATIN CALCIUM 20 MG/1
TABLET, FILM COATED ORAL
Qty: 90 TABLET | Refills: 1 | Status: SHIPPED | OUTPATIENT
Start: 2022-01-10 | End: 2022-10-10 | Stop reason: DRUGHIGH

## 2022-01-10 RX ORDER — METFORMIN HYDROCHLORIDE 1000 MG/1
TABLET ORAL
Qty: 180 TABLET | Refills: 1 | Status: SHIPPED | OUTPATIENT
Start: 2022-01-10

## 2022-02-07 ENCOUNTER — DOCUMENTATION ONLY (OUTPATIENT)
Dept: SLEEP MEDICINE | Age: 72
End: 2022-02-07

## 2022-02-24 ENCOUNTER — OFFICE VISIT (OUTPATIENT)
Dept: FAMILY MEDICINE CLINIC | Age: 72
End: 2022-02-24
Payer: COMMERCIAL

## 2022-02-24 VITALS
BODY MASS INDEX: 32.29 KG/M2 | HEART RATE: 81 BPM | DIASTOLIC BLOOD PRESSURE: 80 MMHG | RESPIRATION RATE: 20 BRPM | SYSTOLIC BLOOD PRESSURE: 127 MMHG | WEIGHT: 218 LBS | OXYGEN SATURATION: 98 % | TEMPERATURE: 97.7 F | HEIGHT: 69 IN

## 2022-02-24 DIAGNOSIS — E11.21 CONTROLLED TYPE 2 DIABETES MELLITUS WITH DIABETIC NEPHROPATHY, WITHOUT LONG-TERM CURRENT USE OF INSULIN (HCC): ICD-10-CM

## 2022-02-24 DIAGNOSIS — E55.9 HYPOVITAMINOSIS D: ICD-10-CM

## 2022-02-24 DIAGNOSIS — E11.9 DIABETIC EYE EXAM (HCC): ICD-10-CM

## 2022-02-24 DIAGNOSIS — G83.9 PARTIAL UNILATERAL PARESIS (HCC): ICD-10-CM

## 2022-02-24 DIAGNOSIS — I10 HYPERTENSION, WELL CONTROLLED: ICD-10-CM

## 2022-02-24 DIAGNOSIS — E78.2 MIXED HYPERCHOLESTEROLEMIA AND HYPERTRIGLYCERIDEMIA: ICD-10-CM

## 2022-02-24 DIAGNOSIS — Z13.31 NEGATIVE DEPRESSION SCREENING: ICD-10-CM

## 2022-02-24 DIAGNOSIS — E11.65 POORLY CONTROLLED TYPE 2 DIABETES MELLITUS (HCC): Primary | ICD-10-CM

## 2022-02-24 DIAGNOSIS — Z01.00 DIABETIC EYE EXAM (HCC): ICD-10-CM

## 2022-02-24 DIAGNOSIS — R35.0 FREQUENCY OF URINATION: ICD-10-CM

## 2022-02-24 LAB
GLUCOSE POC: 162 MG/DL
HBA1C MFR BLD HPLC: 9.1 %

## 2022-02-24 PROCEDURE — 99214 OFFICE O/P EST MOD 30 MIN: CPT | Performed by: INTERNAL MEDICINE

## 2022-02-24 PROCEDURE — 83036 HEMOGLOBIN GLYCOSYLATED A1C: CPT | Performed by: INTERNAL MEDICINE

## 2022-02-24 PROCEDURE — 82962 GLUCOSE BLOOD TEST: CPT | Performed by: INTERNAL MEDICINE

## 2022-02-24 RX ORDER — GLIPIZIDE 10 MG/1
10 TABLET ORAL 2 TIMES DAILY
Qty: 60 TABLET | Refills: 3 | Status: SHIPPED | OUTPATIENT
Start: 2022-02-24

## 2022-02-24 RX ORDER — CHOLECALCIFEROL TAB 125 MCG (5000 UNIT) 125 MCG
TAB ORAL
Qty: 90 TABLET | Refills: 1 | Status: SHIPPED | OUTPATIENT
Start: 2022-02-24

## 2022-02-24 NOTE — PROGRESS NOTES
Chief Complaint   Patient presents with    Follow-up     HPI:  Favian Collier. is a 73 y.o.  male with h/o hypertension, hypertension, diabetes type 2(A1C=8.8%), obesity presents for follow up. Patient has been doing well. Blood pressure is at goal. Patient has no complaints. A1C of 9.1%shows poor glycemic control. Review of Systems  As per hpi    Past Medical History:   Diagnosis Date    Diabetes ([address]     Gout 3/24/2014    HTN (hypertension) 3/24/2014    Hypercholesterolemia     Obesity, Class II, BMI 35-39.9, with comorbidity 3/24/2014    Partial unilateral paresis ([address] 3/24/2014     Past Surgical History:   Procedure Laterality Date    HX ORTHOPAEDIC      repair left eye socket     Social History     Socioeconomic History    Marital status:    Tobacco Use    Smoking status: Never Smoker    Smokeless tobacco: Never Used   Substance and Sexual Activity    Alcohol use: No    Drug use: No     No family history on file. Current Outpatient Medications   Medication Sig Dispense Refill    atorvastatin (LIPITOR) 20 mg tablet TAKE 1 TABLET BY MOUTH EVERY DAY 90 Tablet 1    metFORMIN (GLUCOPHAGE) 1,000 mg tablet TAKE 1 TABLET BY MOUTH 2 TIMES DAILY (WITH MEALS). 180 Tablet 1    glipiZIDE (GLUCOTROL) 10 mg tablet Take 1 Tablet by mouth two (2) times a day. 60 Tablet 3    allopurinoL (ZYLOPRIM) 300 mg tablet TAKE 1 TABLET BY MOUTH DAILY TO PREVENT GOUT ATTACKS 30 Tab 0    cholecalciferol (VITAMIN D3) (5000 Units/125 mcg) tab tablet TAKE 1 TABLET BY MOUTH EVERY DAY 90 Tab 1    diclofenac EC (VOLTAREN) 75 mg EC tablet 1 tab by mouth 2 times a day with meals  Indications: joint damage causing pain and loss of function 60 Tab 3    glucose blood VI test strips (ONETOUCH ULTRA TEST) strip Check blood sugar once a day 50 Strip 3    Insulin Needles, Disposable, 31 gauge x 5/16\" ndle One touch meter 1 Package 11    aspirin (ASPIRIN) 325 mg tablet Take 1 Tab by mouth daily.  90 Tab 1 No Known Allergies    Objective:  Visit Vitals  /80   Pulse 81   Temp 97.7 °F (36.5 °C) (Temporal)   Resp 20   Ht 5' 9\" (1.753 m)   Wt 218 lb (98.9 kg)   SpO2 98%   BMI 32.19 kg/m²     Physical Exam:   General appearance - alert, well appearing in no distress  Mental status - alert, oriented to person, place, and time  Chest - clear to auscultation, no wheezes, rales or rhonchi  Heart - normal rate, regular rhythm, no murmurs  Abdomen - soft, nontender, nondistended, no organomegaly  Ext-peripheral pulses normal, no pedal edema  Neuro -no focal findings  Back-full range of motion, no tenderness, palpable spasm or pain on motion     Results for orders placed or performed in visit on 02/24/22   AMB POC HEMOGLOBIN A1C   Result Value Ref Range    Hemoglobin A1c (POC) 9.1 %   AMB POC GLUCOSE BLOOD, BY GLUCOSE MONITORING DEVICE   Result Value Ref Range    Glucose  MG/DL     Assessment/Plan:  Diagnoses and all orders for this visit:    Poorly controlled type 2 diabetes mellitus (HCC)  -     AMB POC HEMOGLOBIN A1C  -     AMB POC GLUCOSE BLOOD, BY GLUCOSE MONITORING DEVICE  -     METABOLIC PANEL, COMPREHENSIVE; Future  -     CBC WITH AUTOMATED DIFF; Future    Mixed hypercholesterolemia and hypertriglyceridemia  -     LIPID PANEL; Future    Hypertension, well controlled  -     METABOLIC PANEL, COMPREHENSIVE; Future  -     CBC WITH AUTOMATED DIFF; Future    Frequency of urination  -     URINALYSIS W/ RFLX MICROSCOPIC; Future    Diabetic eye exam ([facility]  -     REFERRAL TO OPHTHALMOLOGY    Partial unilateral paresis ([facility])  -     METABOLIC PANEL, COMPREHENSIVE; Future  -     CBC WITH AUTOMATED DIFF; Future    Negative depression screening    Controlled type 2 diabetes mellitus with diabetic nephropathy, without long-term current use of insulin ([facility])  -     glipiZIDE (GLUCOTROL) 10 mg tablet;  Take 1 Tablet by mouth two (2) times a day., Normal, Disp-60 Tablet, R-3    Hypovitaminosis D  -     cholecalciferol (Vitamin D3) (5000 Units/125 mcg) tab tablet; TAKE 1 TABLET BY MOUTH EVERY DAY, Normal, Disp-90 Tablet, R-1      Patient Instructions   Diabetes treatment:  Metformin 1000 mg twice a day  Glipizide 10mg twice a day      Follow-up and Dispositions    · Return 3-4 weeks, for f/u diabetes treatment.

## 2022-03-19 PROBLEM — M1A.00X0: Status: ACTIVE | Noted: 2018-08-14

## 2022-03-19 PROBLEM — E11.42 WELL CONTROLLED TYPE 2 DIABETES MELLITUS WITH PERIPHERAL NEUROPATHY (HCC): Status: ACTIVE | Noted: 2018-08-14

## 2022-03-19 PROBLEM — E55.9 HYPOVITAMINOSIS D: Status: ACTIVE | Noted: 2018-08-14

## 2022-03-19 PROBLEM — E11.40 TYPE 2 DIABETES MELLITUS WITH DIABETIC NEUROPATHY (HCC): Status: ACTIVE | Noted: 2019-05-13

## 2022-07-26 NOTE — MR AVS SNAPSHOT
Visit Information Date & Time Provider Department Dept. Phone Encounter #  
 2/23/2017 10:30 AM Sen Gonzalez MD [facility] at [address] [id number] Follow-up Instructions Return 2-3 weeks, for f/u results. Upcoming Health Maintenance Date Due  
 FOOT EXAM Q1 11/4/1962 EYE EXAM RETINAL OR DILATED Q1 11/4/1962 FOBT Q 1 YEAR AGE 50-75 11/4/2002 ZOSTER VACCINE AGE 60> 11/4/2012 HEMOGLOBIN A1C Q6M 10/11/2016 MICROALBUMIN Q1 1/8/2017 LIPID PANEL Q1 4/11/2017 DTaP/Tdap/Td series (2 - Td) 2/23/2027 Allergies as of 2/23/2017  Review Complete On: 2/23/2017 By: Sen Gonzalez MD  
 No Known Allergies Current Immunizations  Never Reviewed No immunizations on file. Not reviewed this visit You Were Diagnosed With   
  
 Codes Comments Uncontrolled type 2 diabetes mellitus without complication, without long-term current use of insulin ([facility]    -  Primary ICD-10-CM: E11.65 ICD-9-CM: 250.02 Mixed hypercholesterolemia and hypertriglyceridemia     ICD-10-CM: E78.2 ICD-9-CM: 272.2 Obesity, Class II, BMI 35-39.9, with comorbidity (HCC)     ICD-10-CM: E66.01 
ICD-9-CM: 278.01 Hypertension, well controlled     ICD-10-CM: I10 
ICD-9-CM: 401.9 Hypovitaminosis D     ICD-10-CM: E55.9 ICD-9-CM: 268.9 Diabetic eye exam ([facility]     ICD-10-CM: E11.9, Z01.00 ICD-9-CM: V72.0, 250.00 Encounter for diabetic foot exam ([facility]     ICD-10-CM: E11.9 ICD-9-CM: 250.00 Encounter for colorectal cancer screening     ICD-10-CM: Z12.11, Z12.12 
ICD-9-CM: V76.51 Vitamin D deficiency     ICD-10-CM: E55.9 ICD-9-CM: 268.9 Annual physical exam     ICD-10-CM: Z00.00 ICD-9-CM: V70.0 Screen for colon cancer     ICD-10-CM: Z12.11 ICD-9-CM: V76.51 Screening for thyroid disorder     ICD-10-CM: Z13.29 ICD-9-CM: V77.0 Screening for prostate cancer     ICD-10-CM: Z12.5 ICD-9-CM: V76.44 Vitals  BP  
  
  
  
  
  
 107/62 (BP 1 Location: Left arm, BP Patient Position: Sitting) Vitals History BMI and BSA Data Body Mass Index Body Surface Area  
 33.17 kg/m 2 2.2 m 2 Preferred Pharmacy Pharmacy Name Phone [address] [facility], [address] [place] [address] [address] Sree Best Your Updated Medication List  
  
   
This list is accurate as of: 2/23/17 11:52 AM.  Always use your most recent med list.  
  
  
  
  
 allopurinol 300 mg tablet Commonly known as:  Samir Night Take 1 Tab by mouth daily. Take every day to prevent gout attacks  
  
 aspirin 325 mg tablet Generic drug:  aspirin Take 325 mg by mouth daily. atorvastatin 20 mg tablet Commonly known as:  LIPITOR Take 1 Tab by mouth daily. Indications: HYPERTRIGLYCERIDEMIA  
  
 glucose blood VI test strips strip Commonly known as:  ONETOUCH ULTRA TEST Check blood sugar once a day  
  
 indomethacin 50 mg capsule Commonly known as:  INDOCIN Take 1 every 8 hrs x 7 days, then 1 every 12 hrs  
  
 metFORMIN 500 mg tablet Commonly known as:  GLUCOPHAGE Take 1 Tab by mouth two (2) times daily (with meals). Indications: TYPE 2 DIABETES MELLITUS Prescriptions Sent to Pharmacy Refills  
 allopurinol (ZYLOPRIM) 300 mg tablet 1 Sig: Take 1 Tab by mouth daily. Take every day to prevent gout attacks Class: Normal  
 Pharmacy: [address] [place] [address] Ave, [address] Ph #: [phone] Route: Oral  
 atorvastatin (LIPITOR) 20 mg tablet 5 Sig: Take 1 Tab by mouth daily. Indications: HYPERTRIGLYCERIDEMIA Class: Normal  
 Pharmacy: [address] [place] [address] Ave, [address] Ph #: [phone] Route: Oral  
  
We Performed the Following AMB POC GLUCOSE BLOOD, BY GLUCOSE MONITORING DEVICE [52628 CPT(R)] AMB POC HEMOGLOBIN A1C [84582 CPT(R)] CBC W/O DIFF [97984 CPT(R)] LIPID PANEL [40941 CPT(R)] METABOLIC PANEL, COMPREHENSIVE [24070 CPT(R)] MICROALBUMIN, UR, RAND W/ MICROALBUMIN/CREA RATIO [id number] CPT(R)] PROSTATE SPECIFIC AG (PSA) [id number] CPT(R)] REFERRAL TO GASTROENTEROLOGY [FGS07 Custom] Comments:  
 Please evaluate patient for colon cancer REFERRAL TO OPHTHALMOLOGY [REF57 Custom] Comments:  
 Please evaluate patient for diabetic eye exam  
 REFERRAL TO PODIATRY [REF90 Custom] Comments:  
 diabetic foot exam  
 TSH 3RD GENERATION [17244 CPT(R)] URINALYSIS W/ RFLX MICROSCOPIC [16123 CPT(R)] VITAMIN D, 25 HYDROXY [id number] CPT(R)] Follow-up Instructions Return 2-3 weeks, for f/u results. Referral Information Referral ID Referred By Referred To  
  
 [id number] [facility], [facility], MD   
   [address], [address] [place] [address] Street Phone: [phone] Fax: [phone] Visits Status Start Date End Date 1 New Request 2/23/17 2/23/18 If your referral has a status of pending review or denied, additional information will be sent to support the outcome of this decision. Referral ID Referred By Referred To  
 [id number] [facility]  
   [address] [place], [facility] Margie Martinez Visits Status Start Date End Date 1 New Request 2/23/17 2/23/18 If your referral has a status of pending review or denied, additional information will be sent to support the outcome of this decision. Referral ID Referred By Referred To  
 [id number] [facility]  
   [address] [facility] [address], [address] [place] [address] Phone: [phone] Fax: [phone] Visits Status Start Date End Date 1 New Request 2/23/17 2/23/18 If your referral has a status of pending review or denied, additional information will be sent to support the outcome of this decision. Introducing [facility] [place] [facility]! Dear Daphne Zavala: Thank you for requesting a [employer] account. Our records indicate that you have previously registered for a [employer] account but its currently inactive. Please call our [employer] support line at [phone]. Additional Information If you have questions, please visit the Frequently Asked Questions section of the [employer] website at [email or web address] Remember, [employer] is NOT to be used for urgent needs. For medical emergencies, dial 911. Now available from your iPhone and Android! Please provide this summary of care documentation to your next provider. Your primary care clinician is listed as Miguel Angel Blount. If you have any questions after today's visit, please call [phone]. Detail Level: Detailed Duration Of Freeze Thaw-Cycle (Seconds): 5 Consent: The patient's consent was obtained including but not limited to risks of crusting, scabbing, blistering, scarring, darker or lighter pigmentary change, recurrence, incomplete removal and infection. Number Of Freeze-Thaw Cycles: 2 freeze-thaw cycles Post-Care Instructions: I reviewed with the patient in detail post-care instructions. Patient is to wear sunprotection, and avoid picking at any of the treated lesions. Pt may apply Vaseline to crusted or scabbing areas. Render Post-Care Instructions In Note?: no Show Applicator Variable?: Yes

## 2022-10-10 DIAGNOSIS — E78.2 MIXED HYPERCHOLESTEROLEMIA AND HYPERTRIGLYCERIDEMIA: Primary | ICD-10-CM

## 2022-10-10 RX ORDER — ATORVASTATIN CALCIUM 40 MG/1
40 TABLET, FILM COATED ORAL DAILY
Qty: 90 TABLET | Refills: 1 | Status: SHIPPED | OUTPATIENT
Start: 2022-10-10

## 2023-05-24 RX ORDER — ASPIRIN 325 MG
325 TABLET ORAL DAILY
COMMUNITY
Start: 2017-03-28

## 2023-05-24 RX ORDER — DICLOFENAC SODIUM 75 MG/1
TABLET, DELAYED RELEASE ORAL
COMMUNITY
Start: 2019-09-17

## 2023-05-24 RX ORDER — ALLOPURINOL 300 MG/1
TABLET ORAL
COMMUNITY
Start: 2020-10-22

## 2023-05-24 RX ORDER — GLIPIZIDE 10 MG/1
10 TABLET ORAL 2 TIMES DAILY
COMMUNITY
Start: 2022-02-24

## 2023-05-24 RX ORDER — ATORVASTATIN CALCIUM 40 MG/1
1 TABLET, FILM COATED ORAL DAILY
COMMUNITY
Start: 2023-04-14 | End: 2023-07-24

## 2023-07-22 DIAGNOSIS — E78.2 MIXED HYPERLIPIDEMIA: ICD-10-CM

## 2023-07-24 RX ORDER — ATORVASTATIN CALCIUM 40 MG/1
TABLET, FILM COATED ORAL
Qty: 30 TABLET | Refills: 0 | Status: SHIPPED | OUTPATIENT
Start: 2023-07-24

## 2023-07-24 NOTE — TELEPHONE ENCOUNTER
Last appointment: 2/24/22  Next appointment: none  Previous refill encounter(s): 4/14/23 #90    Requested Prescriptions     Pending Prescriptions Disp Refills    atorvastatin (LIPITOR) 40 MG tablet [Pharmacy Med Name: ATORVASTATIN 40 MG TABLET] 30 tablet 0     Sig: TAKE 1 TABLET BY MOUTH EVERY DAY         For Pharmacy Admin Tracking Only    Program: Medication Refill  CPA in place:    Recommendation Provided To:    Intervention Detail: New Rx: 1, reason: Patient Preference  Intervention Accepted By:   Brian Redding Closed?:    Time Spent (min): 5

## 2023-08-17 DIAGNOSIS — E78.2 MIXED HYPERLIPIDEMIA: ICD-10-CM

## 2023-08-18 ENCOUNTER — TELEPHONE (OUTPATIENT)
Age: 73
End: 2023-08-18

## 2023-08-18 RX ORDER — ATORVASTATIN CALCIUM 40 MG/1
TABLET, FILM COATED ORAL
Qty: 30 TABLET | Refills: 0 | Status: SHIPPED | OUTPATIENT
Start: 2023-08-18

## 2023-08-18 NOTE — TELEPHONE ENCOUNTER
Left message for patient to schedule a follow up appointment per Dr Bety Mcclain    Will also send letter

## 2023-08-18 NOTE — TELEPHONE ENCOUNTER
Last appointment: 2/24/23  Next appointment: Daisy Mulligan to follow-up in 3-4 weeks  Previous refill encounter(s): 7/24/23 #30    Requested Prescriptions     Pending Prescriptions Disp Refills    atorvastatin (LIPITOR) 40 MG tablet [Pharmacy Med Name: ATORVASTATIN 40 MG TABLET] 30 tablet 0     Sig: TAKE 1 TABLET BY MOUTH [id number] [address] Tracking Only    Program: Medication Refill  CPA in place:    Recommendation Provided To:    Intervention Detail: New Rx: 1, reason: Patient Preference  Intervention Accepted By:   Marta Burger Closed?:    Time Spent (min): 5

## 2024-02-12 ENCOUNTER — ANESTHESIA EVENT (OUTPATIENT)
Facility: HOSPITAL | Age: 74
End: 2024-02-12
Payer: MEDICARE

## 2024-02-13 NOTE — ANESTHESIA PRE PROCEDURE
CMP:   Lab Results   Component Value Date/Time     02/24/2022 11:53 AM    K 4.1 02/24/2022 11:53 AM     02/24/2022 11:53 AM    CO2 28 02/24/2022 11:53 AM    BUN 14 02/24/2022 11:53 AM    CREATININE 1.01 02/24/2022 11:53 AM    GFRAA >60 02/24/2022 11:53 AM    AGRATIO 1.2 02/24/2022 11:53 AM    GLUCOSE 182 02/24/2022 11:53 AM    PROT 7.2 02/24/2022 11:53 AM    CALCIUM 9.6 02/24/2022 11:53 AM    BILITOT 0.6 02/24/2022 11:53 AM    ALKPHOS 116 02/24/2022 11:53 AM    AST 14 02/24/2022 11:53 AM    ALT 32 02/24/2022 11:53 AM       POC Tests: No results for input(s): \"POCGLU\", \"POCNA\", \"POCK\", \"POCCL\", \"POCBUN\", \"POCHEMO\", \"POCHCT\" in the last 72 hours.    Coags: No results found for: \"PROTIME\", \"INR\", \"APTT\"    HCG (If Applicable): No results found for: \"PREGTESTUR\", \"PREGSERUM\", \"HCG\", \"HCGQUANT\"     ABGs: No results found for: \"PHART\", \"PO2ART\", \"DJQ4ZUV\", \"XMH1TBY\", \"BEART\", \"I7JOFWBE\"     Type & Screen (If Applicable):  No results found for: \"LABABO\", \"LABRH\"    Drug/Infectious Status (If Applicable):  No results found for: \"HIV\", \"HEPCAB\"    COVID-19 Screening (If Applicable): No results found for: \"COVID19\"        Anesthesia Evaluation  Patient summary reviewed and Nursing notes reviewed  Airway: Mallampati: III          Dental:    (+) upper dentures      Pulmonary:Negative Pulmonary ROS and normal exam                               Cardiovascular:  Exercise tolerance: good (>4 METS)  (+) hypertension:, hyperlipidemia                  Neuro/Psych:                ROS comment: Partial unilateral paresis  Peripheral neuropathy   GI/Hepatic/Renal:            ROS comment: obesity.   Endo/Other:    (+) DiabetesType II DM, : arthritis:..                  ROS comment: Vitamin D deficiency   Abdominal:             Vascular: negative vascular ROS.         Other Findings:             Anesthesia Plan      TIVA     ASA 2       Induction: intravenous.  continuous noninvasive hemodynamic monitor    Anesthetic plan

## 2024-02-14 ENCOUNTER — HOSPITAL ENCOUNTER (OUTPATIENT)
Facility: HOSPITAL | Age: 74
Setting detail: OUTPATIENT SURGERY
Discharge: HOME | End: 2024-02-14
Attending: INTERNAL MEDICINE | Admitting: INTERNAL MEDICINE
Payer: MEDICARE

## 2024-02-14 ENCOUNTER — ANESTHESIA (OUTPATIENT)
Facility: HOSPITAL | Age: 74
End: 2024-02-14
Payer: MEDICARE

## 2024-02-14 VITALS
RESPIRATION RATE: 17 BRPM | BODY MASS INDEX: 30.69 KG/M2 | TEMPERATURE: 97.6 F | SYSTOLIC BLOOD PRESSURE: 127 MMHG | OXYGEN SATURATION: 94 % | HEIGHT: 70 IN | HEART RATE: 75 BPM | DIASTOLIC BLOOD PRESSURE: 78 MMHG | WEIGHT: 214.4 LBS

## 2024-02-14 PROCEDURE — 3700000001 HC ADD 15 MINUTES (ANESTHESIA): Performed by: INTERNAL MEDICINE

## 2024-02-14 PROCEDURE — 2580000003 HC RX 258: Performed by: INTERNAL MEDICINE

## 2024-02-14 PROCEDURE — 3600007512: Performed by: INTERNAL MEDICINE

## 2024-02-14 PROCEDURE — 3700000000 HC ANESTHESIA ATTENDED CARE: Performed by: INTERNAL MEDICINE

## 2024-02-14 PROCEDURE — 88305 TISSUE EXAM BY PATHOLOGIST: CPT

## 2024-02-14 PROCEDURE — 7100000011 HC PHASE II RECOVERY - ADDTL 15 MIN: Performed by: INTERNAL MEDICINE

## 2024-02-14 PROCEDURE — 7100000010 HC PHASE II RECOVERY - FIRST 15 MIN: Performed by: INTERNAL MEDICINE

## 2024-02-14 PROCEDURE — 6360000002 HC RX W HCPCS: Performed by: REGISTERED NURSE

## 2024-02-14 PROCEDURE — 2709999900 HC NON-CHARGEABLE SUPPLY: Performed by: INTERNAL MEDICINE

## 2024-02-14 PROCEDURE — 3600007502: Performed by: INTERNAL MEDICINE

## 2024-02-14 PROCEDURE — 2500000003 HC RX 250 WO HCPCS: Performed by: REGISTERED NURSE

## 2024-02-14 RX ORDER — LIDOCAINE HYDROCHLORIDE 20 MG/ML
INJECTION, SOLUTION EPIDURAL; INFILTRATION; INTRACAUDAL; PERINEURAL PRN
Status: DISCONTINUED | OUTPATIENT
Start: 2024-02-14 | End: 2024-02-14 | Stop reason: SDUPTHER

## 2024-02-14 RX ORDER — SODIUM CHLORIDE 9 MG/ML
25 INJECTION, SOLUTION INTRAVENOUS PRN
Status: DISCONTINUED | OUTPATIENT
Start: 2024-02-14 | End: 2024-02-14 | Stop reason: HOSPADM

## 2024-02-14 RX ORDER — SODIUM CHLORIDE 0.9 % (FLUSH) 0.9 %
5-40 SYRINGE (ML) INJECTION EVERY 12 HOURS SCHEDULED
Status: DISCONTINUED | OUTPATIENT
Start: 2024-02-14 | End: 2024-02-14 | Stop reason: HOSPADM

## 2024-02-14 RX ORDER — SODIUM CHLORIDE 0.9 % (FLUSH) 0.9 %
5-40 SYRINGE (ML) INJECTION PRN
Status: DISCONTINUED | OUTPATIENT
Start: 2024-02-14 | End: 2024-02-14 | Stop reason: HOSPADM

## 2024-02-14 RX ADMIN — PROPOFOL 100 MG: 10 INJECTION, EMULSION INTRAVENOUS at 08:42

## 2024-02-14 RX ADMIN — PROPOFOL 50 MG: 10 INJECTION, EMULSION INTRAVENOUS at 08:52

## 2024-02-14 RX ADMIN — LIDOCAINE HYDROCHLORIDE 50 MG: 20 INJECTION, SOLUTION EPIDURAL; INFILTRATION; INTRACAUDAL; PERINEURAL at 08:42

## 2024-02-14 RX ADMIN — SODIUM CHLORIDE 25 ML: 9 INJECTION, SOLUTION INTRAVENOUS at 08:12

## 2024-02-14 RX ADMIN — PROPOFOL 50 MG: 10 INJECTION, EMULSION INTRAVENOUS at 08:54

## 2024-02-14 RX ADMIN — PROPOFOL 50 MG: 10 INJECTION, EMULSION INTRAVENOUS at 08:58

## 2024-02-14 RX ADMIN — PROPOFOL 50 MG: 10 INJECTION, EMULSION INTRAVENOUS at 08:47

## 2024-02-14 NOTE — H&P
Ar   vitamin D3 (CHOLECALCIFEROL) 125 MCG (5000 UT) TABS tablet Take 1 tablet by mouth daily 2/24/22   Automatic Reconciliation, Ar   diclofenac (VOLTAREN) 75 MG EC tablet 1 tab by mouth 2 times a day with meals  Indications: joint damage causing pain and loss of function 9/17/19   Automatic Reconciliation, Ar   glipiZIDE (GLUCOTROL) 10 MG tablet Take 1 tablet by mouth 2 times daily 2/24/22   Automatic Reconciliation, Ar   metFORMIN (GLUCOPHAGE) 1000 MG tablet Take 1 tablet by mouth 2 times daily (with meals) 1/10/22   Automatic Reconciliation, Ar     Physical Exam:   Vital Signs: Blood pressure (!) 142/93, pulse 78, temperature 98 °F (36.7 °C), temperature source Temporal, resp. rate 17, height 1.778 m (5' 10\"), weight 97.3 kg (214 lb 6.4 oz), SpO2 97 %.  General: well developed, well nourished   HEENT: unremarkable   Heart: regular rhythm no mumur    Lungs: clear   Abdominal:  benign   Neurological: unremarkable   Extremities: no edema     Findings/Diagnosis: hx of colon polyps  Plan of Care/Planned Procedure: colonoscopy with conscious/deep sedation    Signed:  Andrew Thacker MD 2/14/2024

## 2024-02-14 NOTE — DISCHARGE INSTRUCTIONS
Hansel Toney   [id number]  1950    COLON DISCHARGE INSTRUCTIONS  Discomfort:  Redness at IV site- apply warm compress to area; if redness or soreness persist- contact your physician  There may be a slight amount of blood passed from the rectum  Gaseous discomfort- walking, belching will help relieve any discomfort  You may not operate a vehicle for 12 hours  You may not engage in an occupation involving machinery or appliances for rest of today  You may not drink alcoholic beverages for at least 12 hours  Avoid making any critical decisions for at least 24 hour  DIET:   High fiber diet.   - however -  remember your colon is empty and a heavy meal will produce gas.   Avoid these foods:  vegetables, fried / greasy foods, carbonated drinks for today  MEDICATION:  (See attached)     ACTIVITY:  You may not resume your normal daily activities until tomorrow AM; it is recommended that you spend the remainder of the day resting -  avoid any strenuous activity.  CALL M.D.  ANY SIGN OF:   Increasing pain, nausea, vomiting  Abdominal distension (swelling)  New increased bleeding (oral or rectal)  Fever (chills)  Pain in chest area  Bloody discharge from nose or mouth  Shortness of breath    IMPRESSION:  -- 8 total colon polyps!!! All removed today  -- diverticulosis, which is when small out-pouchings in the inner lining of the colon form, little stretched out pockets. This is very common, and a diet high in fiber with the addition of a daily fiber supplement (like 2 teaspoons of metamucil or psyllium husk fiber powder mixed in water) can help treat this!  -- we saw some hemorrhoids, which are very common, and these are swollen veins at the anal canal or end of the rectum, which can bulge with constipation and straining or frequent bowel movements. Sometimes they cause bleeding, burning, pressure, or even pain. A diet high in fiber helps, but using a daily fiber supplement (like 2 teaspoons of psyllium husk powder

## 2024-02-14 NOTE — OP NOTE
NAME:  Hansel Toney Jr.   :   1950   MRN:   [id number]     Date/Time:  2024 9:10 AM    Colonoscopy Operative Report    Procedure Type:  Colonoscopy with polypectomy (cold snare)     Indications:  hx of colon polyps (8 removed in 2017, one a TVA)  Pre-operative Diagnosis: see indication above  Post-operative Diagnosis:  See findings below  :  Andrew Thacker MD  Referring Provider: -Gilberto Armendariz MD    Exam:  Airway: clear, no airway problems anticipated  Heart: RRR, without gallops or rubs  Lungs: clear bilaterally without wheezes, crackles, or rhonchi  Abdomen: soft, nontender, nondistended, bowel sounds present  Mental Status: awake, alert and oriented to person, place and time    Sedation:  MAC anesthesia Propofol  Procedure Details:  After informed consent was obtained with all risks and benefits of procedure explained and preoperative exam completed, the patient was taken to the endoscopy suite and placed in the left lateral decubitus position.  Upon sequential sedation as per above, a digital rectal exam was performed demonstrating internal and external hemorrhoids.  The Olympus videocolonoscope  was inserted in the rectum and carefully advanced to the cecum, which was identified by the ileocecal valve and appendiceal orifice.   The quality of preparation was good.  The colonoscope was slowly withdrawn with careful evaluation between folds. Retroflexion in the rectum was completed demonstrating internal and external hemorrhoids.     Findings:   ANUS: Anal exam reveals no masses but hemorrhoids, sphincter tone is normal.   RECTUM: Rectal exam reveals no masses but hemorrhoids.   SIGMOID COLON:   -- two sessile polyps, sized 0.3 cm and 0.5 cm, removed with cold snare  -- otherwise, normal mucosa  DESCENDING COLON: The mucosa is normal with good vascular pattern and without ulcers, diverticula, and polyps.   TRANSVERSE COLON:   -- 6 sessile polyps, sized 0.3 cm and 0.8 cm, removed

## 2024-02-14 NOTE — ANESTHESIA POSTPROCEDURE EVALUATION
Department of Anesthesiology  Postprocedure Note    Patient: Hansel Toney Jr.  MRN: [id number]  YOB: 1950  Date of evaluation: 2/14/2024    Procedure Summary       Date: 02/14/24 Room / Location: \[facility]\"" ENDO 03 / [facility]    Anesthesia Start: 0836 Anesthesia Stop: 0913    Procedure: COLONOSCOPY (Lower GI Region) Diagnosis:       Personal history of colonic polyps      (Personal history of colonic polyps [Z86.010])    Surgeons: Andrew Thacker MD Responsible Provider: Bill Alvarenga MD    Anesthesia Type: MAC ASA Status: 2            Anesthesia Type: MAC    Dick Phase I: Dick Score: 10    Dick Phase II: Dick Score: 10    Anesthesia Post Evaluation    Patient location during evaluation: PACU  Patient participation: complete - patient participated  Level of consciousness: awake  Airway patency: patent  Nausea & Vomiting: no vomiting  Cardiovascular status: hemodynamically stable  Respiratory status: acceptable  Hydration status: euvolemic    No notable events documented.

## 2024-02-14 NOTE — PROGRESS NOTES
Endoscopy Case End Note:     Procedure scope was pre-cleaned, per protocol, at bedside by WILLIAM West.       Report received from anesthesia.  See anesthesia flowsheet for intra-procedure vital signs and events.    Left Hearing Aid returned to patient. Upper denture not removed. Belongings  remain under stretcher with patient.

## 2024-02-14 NOTE — PROGRESS NOTES
TRANSFER - IN REPORT:    Verbal report received from JOSE Julian on Hansel Toney Jr.  being received from Endo for routine progression of patient care      Report consisted of patient's Situation, Background, Assessment and   Recommendations(SBAR).     Information from the following report(s) Nurse Handoff Report was reviewed with the receiving nurse.    Opportunity for questions and clarification was provided.      Assessment completed upon patient's arrival to unit and care assumed.       Endoscopy recovery  Patient returned to baseline, vital signs stable (see vital sign flowsheet). Patient offered liquids and tolerated well. Respiratory status within defined limits. Abdomen soft not tender. Skin with in defined limits. Responsible party driving patient home was given the opportunity to ask questions. Patient discharged with documented belongings.    Patient instructed per Dr. Thacker to wait 7 days to resume Excedrin (February 21).

## 2024-02-14 NOTE — PROGRESS NOTES
ARRIVAL INFORMATION:  Verified patient name and date of birth, scheduled procedure, and informed consent.     : Ernestine (wife) contact number: [phone]  Physician and staff can share information with the .     Belongings with patient include:  Clothing,Hearing Aides bilateral, Dentures upper, Jewelry    GI FOCUSED ASSESSMENT:  Neuro: Awake, alert, oriented x4  Respiratory: even and unlabored   GI: soft and non-distended  EKG Rhythm: normal sinus rhythm    Education:Reviewed general discharge instructions and  information.

## 2024-05-17 ENCOUNTER — TELEPHONE (OUTPATIENT)
Dept: PHARMACY | Facility: CLINIC | Age: 74
End: 2024-05-17

## 2024-05-17 NOTE — TELEPHONE ENCOUNTER
[facility] CLINICAL PHARMACY: ADHERENCE REVIEW  Identified care gap per [place] fills with [facility]: Diabetes adherence    Medicare Advantage - MRMA  ACO  Per insurer report, LIS-0 - co-pays are based on tiers and patient is subject to coverage gap.  Patient also appears to be prescribed: Statin    ASSESSMENT    DIABETES ADHERENCE    Insurance Records claims through  05.10.24  (Prior Year PDC = not reported; YTD PDC = FIRST FILL; Potential Fail Date: 24):   METFORMIN TAB 1000 MG last filled on 24 for 90 day supply. Next refill due: 24    Prescribed si tablet/capsule twice daily    Per Reconc[employer] Dispense History and Insurer Portal: last filled on 24 for 90 day supply.     Per [facility]: last picked up on 24 for 90 day supply. Billed through [employer]. 0 refills remaining.    Lab Results   Component Value Date    LABA1C 6.7 (H) 2020    LABA1C 8.4 (H) 2020    LABA1C 11.0 (H) 2019       STATIN ADHERENCE    Insurance Records claims through  05.10.24  (Prior Year PDC = not reported; YTD PDC = 94%; Potential Fail Date: 24):   ROSUVASTATIN TAB 40 MG last filled on 24 for 90 day supply. Next refill due: 24    Prescribed si tablet/capsule daily    Per Insurer Portal: last filled on 24 for 90 day supply.     Per [facility]: last picked up on 24 for 90 day supply. Billed through [employer] refills remaining.    Lab Results   Component Value Date    CHOL 237 (H) 2022    TRIG 331 (H) 2022    HDL 35 2022     Lab Results   Component Value Date    .8 (H) 2022      ALT   Date Value Ref Range Status   2022 32 12 - 78 U/L Final     AST   Date Value Ref Range Status   2022 14 (L) 15 - 37 U/L Final     The 10-year ASCVD risk score (Gwyn MCNEAL, et al., 2019) is: 40.6%    Values used to calculate the score:      Age: 73 years      Sex: Male      Is Non- : No      Diabetic: Yes

## 2024-05-20 NOTE — TELEPHONE ENCOUNTER
Followed up on refill request for METFORMIN TAB 1000 MG.    Per insurance portal, and [employer], a 90 day supply was filled on 24.    Letter sent. We received notification that METFORMIN TAB 1000 MG was out of refills, however it appears NAZ BROOKS just recently sent in a new prescription. He also sent GLIPIZIDE TAB 10MG and it is filled as well.    Shama [facility]audhari [facility]  Population Health    [facility]   [phone] option 2     For Pharmacy Admin Tracking Only    Program: [facility]  CPA in place:  No  Recommendation Provided To: Pharmacy: 1  Intervention Detail: Adherence Monitorin and New Rx: 2, reason: Improve Adherence  Intervention Accepted By: Pharmacy: 2  Gap Closed?: No   Time Spent (min): 30

## 2024-09-12 ENCOUNTER — TELEPHONE (OUTPATIENT)
Dept: PHARMACY | Facility: CLINIC | Age: 74
End: 2024-09-12

## (undated) DEVICE — SNARE ENDOSCP L240CM LOOP W27MM SHTH DIA2.4MM WRK CHN 2.8MM

## (undated) DEVICE — TRAP ENDOSCP POLYP 2 CHMBR DRAWER TYP

## (undated) DEVICE — SET GRAV CK VLV NEEDLESS ST 3 GANGED 4WAY STPCOCK HI FLO 10

## (undated) DEVICE — ENDOSCOPIC KIT COMPLIANCE ENDOKIT

## (undated) DEVICE — IV START KIT: Brand: MEDLINE

## (undated) DEVICE — TIP SUCT TRNSPAR RIB SURF STD BLB RIG NVENT W/ 5IN1 CONN DYND50138] MEDLINE INDUSTRIES INC]

## (undated) DEVICE — CUFF BLD PRSS AD CLTH SGL TB W/ BAYNT CONN ROUNDED CORNER

## (undated) DEVICE — CONTAINER SPEC 20 ML LID NEUT BUFF FORMALIN 10 % POLYPR STS